# Patient Record
Sex: MALE | Race: WHITE | NOT HISPANIC OR LATINO | Employment: PART TIME | URBAN - NONMETROPOLITAN AREA
[De-identification: names, ages, dates, MRNs, and addresses within clinical notes are randomized per-mention and may not be internally consistent; named-entity substitution may affect disease eponyms.]

---

## 2017-02-22 ENCOUNTER — LAB (OUTPATIENT)
Dept: LAB | Facility: HOSPITAL | Age: 77
End: 2017-02-22
Attending: INTERNAL MEDICINE

## 2017-02-22 DIAGNOSIS — E78.5 HYPERLIPIDEMIA: ICD-10-CM

## 2017-02-22 DIAGNOSIS — I25.118 CORONARY ARTERY DISEASE INVOLVING NATIVE CORONARY ARTERY OF NATIVE HEART WITH OTHER FORM OF ANGINA PECTORIS (HCC): ICD-10-CM

## 2017-02-22 DIAGNOSIS — R07.2 PRECORDIAL PAIN: ICD-10-CM

## 2017-02-22 LAB
ALT SERPL W P-5'-P-CCNC: 23 U/L (ref 10–44)
AST SERPL-CCNC: 23 U/L (ref 10–34)

## 2017-02-22 PROCEDURE — 36415 COLL VENOUS BLD VENIPUNCTURE: CPT

## 2017-02-22 PROCEDURE — 84450 TRANSFERASE (AST) (SGOT): CPT | Performed by: INTERNAL MEDICINE

## 2017-02-22 PROCEDURE — 84460 ALANINE AMINO (ALT) (SGPT): CPT | Performed by: INTERNAL MEDICINE

## 2017-02-23 ENCOUNTER — OFFICE VISIT (OUTPATIENT)
Dept: CARDIOLOGY | Facility: CLINIC | Age: 77
End: 2017-02-23

## 2017-02-23 VITALS
WEIGHT: 235.2 LBS | BODY MASS INDEX: 33.67 KG/M2 | SYSTOLIC BLOOD PRESSURE: 151 MMHG | OXYGEN SATURATION: 93 % | HEART RATE: 56 BPM | DIASTOLIC BLOOD PRESSURE: 90 MMHG | HEIGHT: 70 IN

## 2017-02-23 DIAGNOSIS — I25.110 CORONARY ARTERY DISEASE INVOLVING NATIVE CORONARY ARTERY OF NATIVE HEART WITH UNSTABLE ANGINA PECTORIS (HCC): Primary | ICD-10-CM

## 2017-02-23 DIAGNOSIS — E78.2 MIXED HYPERLIPIDEMIA: ICD-10-CM

## 2017-02-23 DIAGNOSIS — E66.09 NON MORBID OBESITY DUE TO EXCESS CALORIES: ICD-10-CM

## 2017-02-23 DIAGNOSIS — I10 ESSENTIAL HYPERTENSION: ICD-10-CM

## 2017-02-23 PROCEDURE — 99214 OFFICE O/P EST MOD 30 MIN: CPT | Performed by: INTERNAL MEDICINE

## 2017-02-23 RX ORDER — LISINOPRIL AND HYDROCHLOROTHIAZIDE 20; 12.5 MG/1; MG/1
2 TABLET ORAL DAILY
Qty: 60 TABLET | Refills: 5 | COMMUNITY
Start: 2017-02-23 | End: 2017-02-23 | Stop reason: SDUPTHER

## 2017-02-23 RX ORDER — LISINOPRIL AND HYDROCHLOROTHIAZIDE 20; 12.5 MG/1; MG/1
2 TABLET ORAL DAILY
Qty: 60 TABLET | Refills: 5 | Status: SHIPPED | OUTPATIENT
Start: 2017-02-23 | End: 2017-06-21 | Stop reason: SDUPTHER

## 2017-02-28 ENCOUNTER — TELEPHONE (OUTPATIENT)
Dept: CARDIOLOGY | Facility: CLINIC | Age: 77
End: 2017-02-28

## 2017-02-28 NOTE — TELEPHONE ENCOUNTER
----- Message from Cayetano Jensen MD sent at 2/23/2017  8:10 PM EST -----  Let him know his liver tests are normal    sdf

## 2017-03-01 ENCOUNTER — TELEPHONE (OUTPATIENT)
Dept: CARDIOLOGY | Facility: CLINIC | Age: 77
End: 2017-03-01

## 2017-03-01 NOTE — TELEPHONE ENCOUNTER
----- Message from Cayetano Jensen MD sent at 2/23/2017  8:10 PM EST -----  Let him know his liver tests are normal    sdf    CALLED PT TO MAKE HIM AWARE THAT PER DR. JENSEN HIS LIVER TESTS ARE NORMAL.    PT AWARE & UNDERSTANDS.    CSMA

## 2017-06-03 ENCOUNTER — TRANSCRIBE ORDERS (OUTPATIENT)
Dept: PULMONOLOGY | Facility: HOSPITAL | Age: 77
End: 2017-06-03

## 2017-06-03 ENCOUNTER — LAB (OUTPATIENT)
Dept: LAB | Facility: HOSPITAL | Age: 77
End: 2017-06-03
Attending: INTERNAL MEDICINE

## 2017-06-03 DIAGNOSIS — E78.5 HYPERLIPIDEMIA, UNSPECIFIED HYPERLIPIDEMIA TYPE: ICD-10-CM

## 2017-06-03 DIAGNOSIS — E55.9 VITAMIN D DEFICIENCY: ICD-10-CM

## 2017-06-03 DIAGNOSIS — I10 ESSENTIAL HYPERTENSION: ICD-10-CM

## 2017-06-03 DIAGNOSIS — I10 ESSENTIAL HYPERTENSION: Primary | ICD-10-CM

## 2017-06-03 LAB
25(OH)D3 SERPL-MCNC: 67 NG/ML
ALBUMIN SERPL-MCNC: 3.9 G/DL (ref 3.4–4.8)
ALBUMIN/GLOB SERPL: 1.2 G/DL (ref 1.5–2.5)
ALP SERPL-CCNC: 115 U/L (ref 40–129)
ALT SERPL W P-5'-P-CCNC: 24 U/L (ref 10–44)
ANION GAP SERPL CALCULATED.3IONS-SCNC: 3.9 MMOL/L (ref 3.6–11.2)
AST SERPL-CCNC: 29 U/L (ref 10–34)
BASOPHILS # BLD AUTO: 0.01 10*3/MM3 (ref 0–0.3)
BASOPHILS NFR BLD AUTO: 0.2 % (ref 0–2)
BILIRUB SERPL-MCNC: 0.9 MG/DL (ref 0.2–1.8)
BUN BLD-MCNC: 14 MG/DL (ref 7–21)
BUN/CREAT SERPL: 14 (ref 7–25)
CALCIUM SPEC-SCNC: 8.7 MG/DL (ref 7.7–10)
CHLORIDE SERPL-SCNC: 105 MMOL/L (ref 99–112)
CHOLEST SERPL-MCNC: 109 MG/DL (ref 0–200)
CO2 SERPL-SCNC: 28.1 MMOL/L (ref 24.3–31.9)
CREAT BLD-MCNC: 1 MG/DL (ref 0.43–1.29)
DEPRECATED RDW RBC AUTO: 41.8 FL (ref 37–54)
EOSINOPHIL # BLD AUTO: 0.17 10*3/MM3 (ref 0–0.7)
EOSINOPHIL NFR BLD AUTO: 3.1 % (ref 0–7)
ERYTHROCYTE [DISTWIDTH] IN BLOOD BY AUTOMATED COUNT: 13.7 % (ref 11.5–14.5)
FOLATE SERPL-MCNC: 20.31 NG/ML (ref 5.4–20)
GFR SERPL CREATININE-BSD FRML MDRD: 73 ML/MIN/1.73
GLOBULIN UR ELPH-MCNC: 3.2 GM/DL
GLUCOSE BLD-MCNC: 123 MG/DL (ref 70–110)
HBA1C MFR BLD: 6.3 % (ref 4.5–5.7)
HCT VFR BLD AUTO: 42.5 % (ref 42–52)
HDLC SERPL-MCNC: 30 MG/DL (ref 60–100)
HGB BLD-MCNC: 14.7 G/DL (ref 14–18)
IMM GRANULOCYTES # BLD: 0.01 10*3/MM3 (ref 0–0.03)
IMM GRANULOCYTES NFR BLD: 0.2 % (ref 0–0.5)
LDLC SERPL CALC-MCNC: 61 MG/DL (ref 0–100)
LDLC/HDLC SERPL: 2.03 {RATIO}
LYMPHOCYTES # BLD AUTO: 1.97 10*3/MM3 (ref 1–3)
LYMPHOCYTES NFR BLD AUTO: 35.4 % (ref 16–46)
MCH RBC QN AUTO: 29.2 PG (ref 27–33)
MCHC RBC AUTO-ENTMCNC: 34.6 G/DL (ref 33–37)
MCV RBC AUTO: 84.3 FL (ref 80–94)
MONOCYTES # BLD AUTO: 0.61 10*3/MM3 (ref 0.1–0.9)
MONOCYTES NFR BLD AUTO: 11 % (ref 0–12)
NEUTROPHILS # BLD AUTO: 2.79 10*3/MM3 (ref 1.4–6.5)
NEUTROPHILS NFR BLD AUTO: 50.1 % (ref 40–75)
OSMOLALITY SERPL CALC.SUM OF ELEC: 275.7 MOSM/KG (ref 273–305)
PLATELET # BLD AUTO: 179 10*3/MM3 (ref 130–400)
PMV BLD AUTO: 11.2 FL (ref 6–10)
POTASSIUM BLD-SCNC: 3.5 MMOL/L (ref 3.5–5.3)
PROT SERPL-MCNC: 7.1 G/DL (ref 6–8)
PSA SERPL-MCNC: 0.49 NG/ML (ref 0–4)
RBC # BLD AUTO: 5.04 10*6/MM3 (ref 4.7–6.1)
SODIUM BLD-SCNC: 137 MMOL/L (ref 135–153)
TRIGL SERPL-MCNC: 90 MG/DL (ref 0–150)
VIT B12 BLD-MCNC: 368 PG/ML (ref 211–911)
VLDLC SERPL-MCNC: 18 MG/DL
WBC NRBC COR # BLD: 5.56 10*3/MM3 (ref 4.5–12.5)

## 2017-06-03 PROCEDURE — 84153 ASSAY OF PSA TOTAL: CPT | Performed by: INTERNAL MEDICINE

## 2017-06-03 PROCEDURE — 80053 COMPREHEN METABOLIC PANEL: CPT | Performed by: INTERNAL MEDICINE

## 2017-06-03 PROCEDURE — 82607 VITAMIN B-12: CPT | Performed by: INTERNAL MEDICINE

## 2017-06-03 PROCEDURE — 36415 COLL VENOUS BLD VENIPUNCTURE: CPT

## 2017-06-03 PROCEDURE — 80061 LIPID PANEL: CPT | Performed by: INTERNAL MEDICINE

## 2017-06-03 PROCEDURE — 82746 ASSAY OF FOLIC ACID SERUM: CPT | Performed by: INTERNAL MEDICINE

## 2017-06-03 PROCEDURE — 85025 COMPLETE CBC W/AUTO DIFF WBC: CPT | Performed by: INTERNAL MEDICINE

## 2017-06-03 PROCEDURE — 83036 HEMOGLOBIN GLYCOSYLATED A1C: CPT | Performed by: INTERNAL MEDICINE

## 2017-06-03 PROCEDURE — 82306 VITAMIN D 25 HYDROXY: CPT | Performed by: INTERNAL MEDICINE

## 2017-06-26 RX ORDER — LISINOPRIL AND HYDROCHLOROTHIAZIDE 20; 12.5 MG/1; MG/1
TABLET ORAL
Qty: 180 TABLET | Refills: 5 | Status: SHIPPED | OUTPATIENT
Start: 2017-06-26 | End: 2020-09-17

## 2017-12-04 ENCOUNTER — LAB (OUTPATIENT)
Dept: LAB | Facility: HOSPITAL | Age: 77
End: 2017-12-04
Attending: INTERNAL MEDICINE

## 2017-12-04 ENCOUNTER — TRANSCRIBE ORDERS (OUTPATIENT)
Dept: ADMINISTRATIVE | Facility: HOSPITAL | Age: 77
End: 2017-12-04

## 2017-12-04 DIAGNOSIS — E11.9 DIABETES MELLITUS WITHOUT COMPLICATION (HCC): ICD-10-CM

## 2017-12-04 DIAGNOSIS — E55.9 VITAMIN D DEFICIENCY: ICD-10-CM

## 2017-12-04 DIAGNOSIS — M79.604 RIGHT LEG PAIN: ICD-10-CM

## 2017-12-04 DIAGNOSIS — I10 BENIGN HYPERTENSION: ICD-10-CM

## 2017-12-04 DIAGNOSIS — E11.9 DIABETES MELLITUS WITHOUT COMPLICATION (HCC): Primary | ICD-10-CM

## 2017-12-04 LAB
25(OH)D3 SERPL-MCNC: 46 NG/ML
ALBUMIN SERPL-MCNC: 3.9 G/DL (ref 3.4–4.8)
ALBUMIN/GLOB SERPL: 1.3 G/DL (ref 1.5–2.5)
ALP SERPL-CCNC: 127 U/L (ref 40–129)
ALT SERPL W P-5'-P-CCNC: 27 U/L (ref 10–44)
ANION GAP SERPL CALCULATED.3IONS-SCNC: 5.1 MMOL/L (ref 3.6–11.2)
AST SERPL-CCNC: 25 U/L (ref 10–34)
BASOPHILS # BLD AUTO: 0.03 10*3/MM3 (ref 0–0.3)
BASOPHILS NFR BLD AUTO: 0.4 % (ref 0–2)
BILIRUB SERPL-MCNC: 0.7 MG/DL (ref 0.2–1.8)
BUN BLD-MCNC: 14 MG/DL (ref 7–21)
BUN/CREAT SERPL: 13.3 (ref 7–25)
CALCIUM SPEC-SCNC: 8.5 MG/DL (ref 7.7–10)
CHLORIDE SERPL-SCNC: 104 MMOL/L (ref 99–112)
CK SERPL-CCNC: 104 U/L (ref 24–204)
CO2 SERPL-SCNC: 30.9 MMOL/L (ref 24.3–31.9)
CREAT BLD-MCNC: 1.05 MG/DL (ref 0.43–1.29)
DEPRECATED RDW RBC AUTO: 41.9 FL (ref 37–54)
EOSINOPHIL # BLD AUTO: 0.29 10*3/MM3 (ref 0–0.7)
EOSINOPHIL NFR BLD AUTO: 4.3 % (ref 0–7)
ERYTHROCYTE [DISTWIDTH] IN BLOOD BY AUTOMATED COUNT: 13.8 % (ref 11.5–14.5)
FOLATE SERPL-MCNC: 15.76 NG/ML (ref 5.4–20)
GFR SERPL CREATININE-BSD FRML MDRD: 69 ML/MIN/1.73
GLOBULIN UR ELPH-MCNC: 3 GM/DL
GLUCOSE BLD-MCNC: 109 MG/DL (ref 70–110)
HBA1C MFR BLD: 6.3 % (ref 4.5–5.7)
HCT VFR BLD AUTO: 43.2 % (ref 42–52)
HGB BLD-MCNC: 14.6 G/DL (ref 14–18)
IMM GRANULOCYTES # BLD: 0.03 10*3/MM3 (ref 0–0.03)
IMM GRANULOCYTES NFR BLD: 0.4 % (ref 0–0.5)
LYMPHOCYTES # BLD AUTO: 1.91 10*3/MM3 (ref 1–3)
LYMPHOCYTES NFR BLD AUTO: 28.6 % (ref 16–46)
MCH RBC QN AUTO: 28.6 PG (ref 27–33)
MCHC RBC AUTO-ENTMCNC: 33.8 G/DL (ref 33–37)
MCV RBC AUTO: 84.7 FL (ref 80–94)
MONOCYTES # BLD AUTO: 0.68 10*3/MM3 (ref 0.1–0.9)
MONOCYTES NFR BLD AUTO: 10.2 % (ref 0–12)
NEUTROPHILS # BLD AUTO: 3.74 10*3/MM3 (ref 1.4–6.5)
NEUTROPHILS NFR BLD AUTO: 56.1 % (ref 40–75)
OSMOLALITY SERPL CALC.SUM OF ELEC: 280.5 MOSM/KG (ref 273–305)
PLATELET # BLD AUTO: 195 10*3/MM3 (ref 130–400)
PMV BLD AUTO: 10.9 FL (ref 6–10)
POTASSIUM BLD-SCNC: 3.6 MMOL/L (ref 3.5–5.3)
PROT SERPL-MCNC: 6.9 G/DL (ref 6–8)
RBC # BLD AUTO: 5.1 10*6/MM3 (ref 4.7–6.1)
SODIUM BLD-SCNC: 140 MMOL/L (ref 135–153)
VIT B12 BLD-MCNC: 512 PG/ML (ref 211–911)
WBC NRBC COR # BLD: 6.68 10*3/MM3 (ref 4.5–12.5)

## 2017-12-04 PROCEDURE — 82306 VITAMIN D 25 HYDROXY: CPT

## 2017-12-04 PROCEDURE — 80053 COMPREHEN METABOLIC PANEL: CPT

## 2017-12-04 PROCEDURE — 82607 VITAMIN B-12: CPT

## 2017-12-04 PROCEDURE — 83036 HEMOGLOBIN GLYCOSYLATED A1C: CPT

## 2017-12-04 PROCEDURE — 36415 COLL VENOUS BLD VENIPUNCTURE: CPT

## 2017-12-04 PROCEDURE — 82746 ASSAY OF FOLIC ACID SERUM: CPT

## 2017-12-04 PROCEDURE — 82550 ASSAY OF CK (CPK): CPT

## 2017-12-04 PROCEDURE — 85025 COMPLETE CBC W/AUTO DIFF WBC: CPT

## 2018-01-29 ENCOUNTER — TRANSCRIBE ORDERS (OUTPATIENT)
Dept: ADMINISTRATIVE | Facility: HOSPITAL | Age: 78
End: 2018-01-29

## 2018-01-29 ENCOUNTER — LAB (OUTPATIENT)
Dept: LAB | Facility: HOSPITAL | Age: 78
End: 2018-01-29
Attending: INTERNAL MEDICINE

## 2018-01-29 DIAGNOSIS — R19.7 DIARRHEA, UNSPECIFIED TYPE: Primary | ICD-10-CM

## 2018-01-29 DIAGNOSIS — R19.7 DIARRHEA, UNSPECIFIED TYPE: ICD-10-CM

## 2018-01-29 LAB
027 TOXIN: NORMAL
C DIFF TOX GENS STL QL NAA+PROBE: NEGATIVE
LACTOFERRIN STL QL LA: NEGATIVE

## 2018-01-29 PROCEDURE — 87045 FECES CULTURE AEROBIC BACT: CPT

## 2018-01-29 PROCEDURE — 83631 LACTOFERRIN FECAL (QUANT): CPT

## 2018-01-29 PROCEDURE — 87177 OVA AND PARASITES SMEARS: CPT

## 2018-01-29 PROCEDURE — 87209 SMEAR COMPLEX STAIN: CPT

## 2018-01-29 PROCEDURE — 87493 C DIFF AMPLIFIED PROBE: CPT

## 2018-01-29 PROCEDURE — 87046 STOOL CULTR AEROBIC BACT EA: CPT

## 2018-01-29 PROCEDURE — 87186 SC STD MICRODIL/AGAR DIL: CPT

## 2018-01-29 PROCEDURE — 87899 AGENT NOS ASSAY W/OPTIC: CPT

## 2018-02-01 LAB
BACTERIA SPEC AEROBE CULT: NORMAL
O+P SPEC MICRO: NORMAL
OVA + PARASITE RESULT 1: NORMAL

## 2018-03-05 ENCOUNTER — LAB (OUTPATIENT)
Dept: LAB | Facility: HOSPITAL | Age: 78
End: 2018-03-05
Attending: INTERNAL MEDICINE

## 2018-03-05 ENCOUNTER — TRANSCRIBE ORDERS (OUTPATIENT)
Dept: ADMINISTRATIVE | Facility: HOSPITAL | Age: 78
End: 2018-03-05

## 2018-03-05 DIAGNOSIS — E78.5 HYPERLIPIDEMIA, UNSPECIFIED HYPERLIPIDEMIA TYPE: ICD-10-CM

## 2018-03-05 DIAGNOSIS — R53.83 FATIGUE, UNSPECIFIED TYPE: ICD-10-CM

## 2018-03-05 DIAGNOSIS — I10 BENIGN HYPERTENSION: Primary | ICD-10-CM

## 2018-03-05 DIAGNOSIS — Z12.5 SPECIAL SCREENING FOR MALIGNANT NEOPLASM OF PROSTATE: ICD-10-CM

## 2018-03-05 DIAGNOSIS — E55.9 VITAMIN D DEFICIENCY: ICD-10-CM

## 2018-03-05 DIAGNOSIS — I10 BENIGN HYPERTENSION: ICD-10-CM

## 2018-03-05 LAB
25(OH)D3 SERPL-MCNC: 68 NG/ML
ALBUMIN SERPL-MCNC: 4 G/DL (ref 3.4–4.8)
ALBUMIN/GLOB SERPL: 1.4 G/DL (ref 1.5–2.5)
ALP SERPL-CCNC: 121 U/L (ref 40–129)
ALT SERPL W P-5'-P-CCNC: 20 U/L (ref 10–44)
ANION GAP SERPL CALCULATED.3IONS-SCNC: 5.7 MMOL/L (ref 3.6–11.2)
AST SERPL-CCNC: 21 U/L (ref 10–34)
BASOPHILS # BLD AUTO: 0.03 10*3/MM3 (ref 0–0.3)
BASOPHILS NFR BLD AUTO: 0.4 % (ref 0–2)
BILIRUB SERPL-MCNC: 0.6 MG/DL (ref 0.2–1.8)
BUN BLD-MCNC: 15 MG/DL (ref 7–21)
BUN/CREAT SERPL: 15.3 (ref 7–25)
CALCIUM SPEC-SCNC: 8.8 MG/DL (ref 7.7–10)
CHLORIDE SERPL-SCNC: 104 MMOL/L (ref 99–112)
CHOLEST SERPL-MCNC: 102 MG/DL (ref 0–200)
CO2 SERPL-SCNC: 28.3 MMOL/L (ref 24.3–31.9)
CREAT BLD-MCNC: 0.98 MG/DL (ref 0.43–1.29)
DEPRECATED RDW RBC AUTO: 42.8 FL (ref 37–54)
EOSINOPHIL # BLD AUTO: 0.22 10*3/MM3 (ref 0–0.7)
EOSINOPHIL NFR BLD AUTO: 3.2 % (ref 0–7)
ERYTHROCYTE [DISTWIDTH] IN BLOOD BY AUTOMATED COUNT: 13.8 % (ref 11.5–14.5)
FOLATE SERPL-MCNC: 14.6 NG/ML (ref 5.4–20)
GFR SERPL CREATININE-BSD FRML MDRD: 75 ML/MIN/1.73
GLOBULIN UR ELPH-MCNC: 2.8 GM/DL
GLUCOSE BLD-MCNC: 115 MG/DL (ref 70–110)
HCT VFR BLD AUTO: 42.8 % (ref 42–52)
HDLC SERPL-MCNC: 29 MG/DL (ref 60–100)
HGB BLD-MCNC: 14.4 G/DL (ref 14–18)
IMM GRANULOCYTES # BLD: 0.02 10*3/MM3 (ref 0–0.03)
IMM GRANULOCYTES NFR BLD: 0.3 % (ref 0–0.5)
LDLC SERPL CALC-MCNC: 56 MG/DL (ref 0–100)
LDLC/HDLC SERPL: 1.94 {RATIO}
LYMPHOCYTES # BLD AUTO: 2.04 10*3/MM3 (ref 1–3)
LYMPHOCYTES NFR BLD AUTO: 29.7 % (ref 16–46)
MCH RBC QN AUTO: 28.7 PG (ref 27–33)
MCHC RBC AUTO-ENTMCNC: 33.6 G/DL (ref 33–37)
MCV RBC AUTO: 85.4 FL (ref 80–94)
MONOCYTES # BLD AUTO: 0.79 10*3/MM3 (ref 0.1–0.9)
MONOCYTES NFR BLD AUTO: 11.5 % (ref 0–12)
NEUTROPHILS # BLD AUTO: 3.78 10*3/MM3 (ref 1.4–6.5)
NEUTROPHILS NFR BLD AUTO: 54.9 % (ref 40–75)
OSMOLALITY SERPL CALC.SUM OF ELEC: 277.4 MOSM/KG (ref 273–305)
PLATELET # BLD AUTO: 188 10*3/MM3 (ref 130–400)
PMV BLD AUTO: 10.8 FL (ref 6–10)
POTASSIUM BLD-SCNC: 3.6 MMOL/L (ref 3.5–5.3)
PROT SERPL-MCNC: 6.8 G/DL (ref 6–8)
PSA SERPL-MCNC: 0.38 NG/ML (ref 0–4)
RBC # BLD AUTO: 5.01 10*6/MM3 (ref 4.7–6.1)
SODIUM BLD-SCNC: 138 MMOL/L (ref 135–153)
TRIGL SERPL-MCNC: 83 MG/DL (ref 0–150)
VIT B12 BLD-MCNC: 289 PG/ML (ref 211–911)
VLDLC SERPL-MCNC: 16.6 MG/DL
WBC NRBC COR # BLD: 6.88 10*3/MM3 (ref 4.5–12.5)

## 2018-03-05 PROCEDURE — 82306 VITAMIN D 25 HYDROXY: CPT

## 2018-03-05 PROCEDURE — 80061 LIPID PANEL: CPT

## 2018-03-05 PROCEDURE — 82607 VITAMIN B-12: CPT

## 2018-03-05 PROCEDURE — 82746 ASSAY OF FOLIC ACID SERUM: CPT

## 2018-03-05 PROCEDURE — G0103 PSA SCREENING: HCPCS

## 2018-03-05 PROCEDURE — 85025 COMPLETE CBC W/AUTO DIFF WBC: CPT

## 2018-03-05 PROCEDURE — 36415 COLL VENOUS BLD VENIPUNCTURE: CPT

## 2018-03-05 PROCEDURE — 80053 COMPREHEN METABOLIC PANEL: CPT

## 2018-03-12 ENCOUNTER — TRANSCRIBE ORDERS (OUTPATIENT)
Dept: ADMINISTRATIVE | Facility: HOSPITAL | Age: 78
End: 2018-03-12

## 2018-03-12 DIAGNOSIS — I73.9 PAD (PERIPHERAL ARTERY DISEASE) (HCC): Primary | ICD-10-CM

## 2018-03-16 ENCOUNTER — OFFICE VISIT (OUTPATIENT)
Dept: CARDIOLOGY | Facility: CLINIC | Age: 78
End: 2018-03-16

## 2018-03-16 VITALS
OXYGEN SATURATION: 95 % | HEART RATE: 55 BPM | SYSTOLIC BLOOD PRESSURE: 127 MMHG | HEIGHT: 69 IN | WEIGHT: 234.8 LBS | BODY MASS INDEX: 34.78 KG/M2 | DIASTOLIC BLOOD PRESSURE: 79 MMHG

## 2018-03-16 DIAGNOSIS — I10 ESSENTIAL HYPERTENSION: Primary | ICD-10-CM

## 2018-03-16 DIAGNOSIS — E78.2 MIXED HYPERLIPIDEMIA: ICD-10-CM

## 2018-03-16 DIAGNOSIS — I25.110 CORONARY ARTERY DISEASE INVOLVING NATIVE CORONARY ARTERY OF NATIVE HEART WITH UNSTABLE ANGINA PECTORIS (HCC): ICD-10-CM

## 2018-03-16 PROCEDURE — 99213 OFFICE O/P EST LOW 20 MIN: CPT | Performed by: INTERNAL MEDICINE

## 2018-03-16 PROCEDURE — 93000 ELECTROCARDIOGRAM COMPLETE: CPT | Performed by: INTERNAL MEDICINE

## 2018-03-16 NOTE — PROGRESS NOTES
[facility] CARDIOLOGY  [address] [facility] [address]  [place] [address]  Phone: [phone]  Fax: [phone]    03/16/2018    Chief Complaint: Routine followup of , CAD    History:   Christopher Clay is a 77 y.o. male seen in followup, Pt with hx as below. Non smoker. No chest pain or SOB. Having some claudication symptoms for 6 months R> L. With walking several blocks. No PAD hx. Mild edema c/o. Taking ASA plavix and lipitor. CK normal in 12/2017.             Christopher is a very pleasant 76-year-old gentleman who presented in June 2015 with an acutely occluded LAD. At the time he had an ejection fraction of 45% and minimal hypokinesis of the anterior wall. He had no other significant disease. Immediately postprocedure he initially complained of some chest tightness however this subsequently improved.  However, his symptoms worsened and he eventually underwent a stress Cardiolite test which demonstrated significant ischemia of the anterior territory. In October 2016 he underwent repeat cardiac catheterization which demonstrated severe disease at the proximal stent margin. He underwent a second drug-eluting stent implantation at that time and states that since then he has felt much better. He denies any angina or anginal-like symptoms. He denies any heart failure symptoms. He denies any palpitations, near syncope or syncopal symptoms. He has been compliant on medical therapy.  He does note that his blood pressures have been elevated with systolics in the 150 range and diastolics in the 80s to 90s    Past Medical History:   Diagnosis Date   • Hyperlipidemia    • Hypertension        Past Social History:  Social History     Social History   • Marital status:      Social History Main Topics   • Smoking status: Never Smoker   • Smokeless tobacco: Never Used   • Alcohol use No   • Drug use: No   • Sexual activity: Defer     Other Topics Concern   • Not on file       Past Family History:  Family  History   Problem Relation Age of Onset   • Heart failure Mother    • No Known Problems Father    • Heart failure Brother        Review of Systems:   Please see HPI  Constitution: No chills, no rigors, no unexplained weight loss or weight gain  Eyes:  No diplopia, no blurred vision, no loss of vision, conjunctiva is pink and sclera is anicteric  ENT:  No tinnitus, no otorrhea, no epistaxis, no sore throat   Respiratory: No cough, no hemoptysis  Cardiovascular: see HPI  Gastrointestinal: No nausea, no vomiting, no hematemesis, no diarrhea or constipation, no melena  Genitourinary: No frequency of dysuria no hematuria  Integument: No pruritis and  no skin rash  Hematologic / Lymphatic: No excessive bleeding, easy bruising, fatigue, lymphadenopathy and petechiae  Musculoskeletal: No joint pain, joint stiffness, joint swelling, muscle pain, muscle weakness and neck pain  Neurological: No dizziness, headaches, light headedness, seizures and vertigo  Endocrine: No frequent urination and nocturia, temperature intolerance, weight gain, unintended and weight loss, unintended      Current Outpatient Prescriptions on File Prior to Visit   Medication Sig Dispense Refill   • aspirin 81 MG EC tablet Take 81 mg by mouth daily.     • atorvastatin (LIPITOR) 40 MG tablet Take 40 mg by mouth daily. 1 TABLET AT BEDTIME     • carvedilol (COREG) 12.5 MG tablet Take 12.5 mg by mouth Every 12 (Twelve) Hours.     • Cholecalciferol (VITAMIN D) 2000 UNITS tablet Take 2,000 Units by mouth Daily.     • clopidogrel (PLAVIX) 75 MG tablet Take 75 mg by mouth daily.     • lisinopril-hydrochlorothiazide (PRINZIDE,ZESTORETIC) 20-12.5 MG per tablet TAKE 2 TABLETS BY MOUTH DAILY 180 tablet 5   • saccharomyces boulardii (FLORASTOR) 250 MG capsule Take 250 mg by mouth 2 (Two) Times a Day.       No current facility-administered medications on file prior to visit.        No Known Allergies    Objective:  Vitals:    03/16/18 1146   BP: 127/79   Pulse: 55    SpO2: 95%     Comfortable NAD  PERRL, conjunctiva clear  Neck supple, no JVD or thyromegaly appreciated  S1/S2 RRR, no m/r/g  Lungs CTA B, normal effort  Abdomen S/NT/ND (+) BS, no HSM appreciated  Extremities warm, no clubbing, cyanosis, or edema  Normal gait  No visible or palpable skin lesions  A/Ox4, mood and affect appropriate  Pulse exam: Geoffrey's:    Some changes of mild chronic venous stasis and no ulcer but there are some mild edema and mild varicosities right calf. 2+ DP and PT BLE.    DATA:            Results for orders placed during the hospital encounter of 10/18/16   Stress test with myocardial perfusion one day    Narrative · Left ventricular ejection fraction is normal (Calculated EF = 51%).  · Myocardial perfusion imaging indicates a large-sized, moderate-to-severe   area of ischemia located in the anterior wall.  · Impressions are consistent with a high risk study.  · moderate risk for ischemic heart disease.          Results for orders placed during the hospital encounter of 10/18/16   Stress test with myocardial perfusion one day    Narrative · Left ventricular ejection fraction is normal (Calculated EF = 51%).  · Myocardial perfusion imaging indicates a large-sized, moderate-to-severe   area of ischemia located in the anterior wall.  · Impressions are consistent with a high risk study.  · moderate risk for ischemic heart disease.          Results for orders placed during the hospital encounter of 11/01/16   Cardiac catheterization    Addendum  · Right dominant coronary artery system with single vessel disease  involving the LAD · Successful angioplasty and stenting of the proximal LAD.   Final impression: Right dominant coronary artery system with single vessel disease involving  the LAD Successful angioplasty and stenting of the LAD  Procedures performed: Coronary angiography Angioplasty and stenting of the LAD  History of present illness: Christopher is a very pleasant 76-year-old gentleman who  presents with a  history of known coronary artery disease status post stent implantation to  his proximal LAD.  He had been doing well however had recurrent chest  discomfort which was initially managed medically.  He eventually did  undergo a stress Cardiolite test which was abnormal and suggestive of  considerable ischemia involving the anterior wall.  The test was  interpreted as moderate risk.  Given this as well as his progressive  symptoms despite medical management he was referred for cardiac  catheterization with possible stent implantation.  The patient was given  informed consent apprising the risks and benefits of doing so he  understood these risks and agreed to proceed.  Procedure in detail the patient was brought to the cardiac catheterization  laboratory and prepped and draped in the usual sterile fashion.  Adequate  anesthesia was obtained by infiltrating the right groin with local  lidocaine.  Using a modified Seldinger technique a 5 [place] sheath was  placed in the right femoral artery.  A 5 [place] JL4 catheter was used to  engage the ostium of the left main coronary artery and angiography was  performed in varying views using hand injection of contrast material.   After that a 5 [place] JR4 catheter was used to engage the ostium of the  right coronary artery and angiography was again performed in varying views  using hand injection of contrast material.  At this point the patient was  given adequate heparinization to achieve an ACT greater than 200.  He  already been taking Plavix.  A 6 [place] sheath was exchanged for the 5  [place] sheath and a 6 [place] XB 4.0 guiding catheter was used to engage  the ostium of the left main coronary artery.  A BMW was placed  atraumatically in the distal portion of the LAD.  The lesion was  predilated with a 2.0 x 15 mm balloon.  A 3.0 x 12 mm Alpine drug-eluting  stent was then implanted at 12 em.  The balloon was reinflated to 12 em.   After this the stent was  postdilated with a 3.5 mm x 12 mm noncompliant  balloon.  Inflation of 12 em was used in the distal portion of the stent  and inflation of 8 em was used approximately to adequately match both  ends of the stent to the vessel diameter.  After this orthogonal  angiography revealed an Result without evidence of dissection and BAY-3 flow in the distal  vascular bed.  The guidewire was withdrawn and orthogonal in Mirian's  repeated which confirmed the above-noted results.  The patient was  returned to the floor without evidence of complication.  Findings in detail: Left Main coronary artery: The left main coronary artery is a large vessel which trifurcates into the  LAD and ramus intermedius branch and the circumflex artery.  The left main  coronary artery is free of atherosclerotic disease.  Left anterior descending artery: The left anterior descending artery is a large vessel which reaches beyond  the apex the ventricle and gives rise to 2 diagonal branches the first of  which is small in the next of which is moderate in size.  There is a  previously implanted stent in the proximal portion of the LAD.  Just  proximal to this there is a severe 99% stenotic area.  This is the culprit  lesion.  The lesion length is 10 mm.  The vessel diameter is approximately  3.25 mm.  BAY flow prior to intervention is BAY 2 BAY flow  postintervention is BAY-3. Post intervention the lesion was reduced to a  0% residual stenosis.  Circumflex artery: The circumflex artery is a moderate-sized vessel which gives rise to one  branching obtuse marginal vessel.  The circumflex artery is free of  atherosclerotic disease.  Ramus intermedius branch: The ramus intermedius branch is a small to moderate-sized vessel which is  free of after scrubbing disease.  Right coronary artery: The right coronary artery is a large vessel which gives rise to the  posterior descending artery and several posterior lateral branches.  The  right coronary  artery is free of after scrubbing disease.  Final impression and plan: Overall it is my impression that Mr. Clay has undergone successful  percutaneous revascularization of the LAD.  I did review his prior films.   In retrospect I did not see anything that was overtly concerning and would  predict in-stent restenosis.  I suspect that he will do well however,  given his clinical history I will follow him closely over the next several  months.       Cayetano Jensen MD 11/21/2016  1:32 PM          Narrative · Right dominant coronary artery system with single vessel disease   involving the LAD  · Successful angioplasty and stenting of the proximal LAD.     Final impression:  Right dominant coronary artery system with single vessel disease involving   the LAD  Successful angioplasty and stenting of the LAD    Procedures performed:  Coronary angiography  Angioplasty and stenting of the LAD    History of present illness:  Christopher is a very pleasant 76-year-old gentleman who presents with a   history of known coronary artery disease status post stent implantation to   his proximal LAD.  He had been doing well however had recurrent chest   discomfort which was initially managed medically.  He eventually did   undergo a stress Cardiolite test which was abnormal and suggestive of   considerable ischemia involving the anterior wall.  The test was   interpreted as moderate risk.  Given this as well as his progressive   symptoms despite medical management he was referred for cardiac   catheterization with possible stent implantation.  The patient was given   informed consent apprising the risks and benefits of doing so he   understood these risks and agreed to proceed.    Procedure in detail the patient was brought to the cardiac catheterization   laboratory and prepped and draped in the usual sterile fashion.  Adequate   anesthesia was obtained by infiltrating the right groin with local   lidocaine.  Using a modified  Seldinger technique a 5 [place] sheath was   placed in the right femoral artery.  A 5 [place] JL4 catheter was used to   engage the ostium of the left main coronary artery and angiography was   performed in varying views using hand injection of contrast material.    After that a 5 [place] JR4 catheter was used to engage the ostium of the   right coronary artery and angiography was again performed in varying views   using hand injection of contrast material.  At this point the patient was   given adequate heparinization to achieve an ACT greater than 200.  He   already been taking Plavix.  A 6 [place]ian sheath was exchanged for the 5   [place] sheath and a 6 [place] XB 4.0 guiding catheter was used to engage   the ostium of the left main coronary artery.  A BMW was placed   atraumatically in the distal portion of the LAD.  The lesion was   predilated with a 2.0 x 15 mm balloon.  A 3.0 x 12 mm Alpine drug-eluting   stent was then implanted at 12 em.  The balloon was reinflated to 12 em.    After this the stent was postdilated with a 3.5 mm x 12 mm noncompliant   balloon.  Inflation of 12 em was used in the distal portion of the stent   and inflation of 8 em was used approximately to adequately match both   ends of the stent to the vessel diameter.  After this orthogonal   angiography revealed an  Result without evidence of dissection and BAY-3 flow in the distal   vascular bed.  The guidewire was withdrawn and orthogonal in Mirian's   repeated which confirmed the above-noted results.  The patient was   returned to the floor without evidence of complication.    Findings in detail:  Left Main coronary artery:  The left main coronary artery is a large vessel which trifurcates into the   LAD and ramus intermedius branch and the circumflex artery.  The left main   coronary artery is free of atherosclerotic disease.    Left anterior descending artery:  The left anterior descending artery is a large vessel which reaches  beyond   the apex the ventricle and gives rise to 2 diagonal branches the first of   which is small in the next of which is moderate in size.  There is a   previously implanted stent in the proximal portion of the LAD.  Just   proximal to this there is a severe 99% stenotic area.  This is the culprit   lesion.  The lesion length is 10 mm.  The vessel diameter is approximately   3.25 mm.  BAY flow prior to intervention is BAY 2 BAY flow   postintervention is BAY-3.    Circumflex artery:  The circumflex artery is a moderate-sized vessel which gives rise to one   branching obtuse marginal vessel.  The circumflex artery is free of   atherosclerotic disease.    Ramus intermedius branch:  The ramus intermedius branch is a small to moderate-sized vessel which is   free of after scrubbing disease.    Right coronary artery:  The right coronary artery is a large vessel which gives rise to the   posterior descending artery and several posterior lateral branches.  The   right coronary artery is free of after scrubbing disease.    Final impression and plan:  Overall it is my impression that Mr. Clay has undergone successful   percutaneous revascularization of the LAD.  I did review his prior films.    In retrospect I did not see anything that was overtly concerning and would   predict in-stent restenosis.  I suspect that he will do well however,   given his clinical history I will follow him closely over the next several   months.         ECG 12 Lead  Date/Time: 3/16/2018 11:33 AM  Performed by: KIRBY GARCIA  Authorized by: KIRBY GARCIA               I personally viewed and interpreted the patient's EKG:      A/P:    CAD: stable. LDL nml.     Claudication: venous. Recommend TEDS hose. nml pulses. Teresa Q10.    F/u 6months.           Thank you for allowing me to participate in the care of Christopher Clay. Feel free to contact me directly with any further questions or concerns.

## 2018-09-10 ENCOUNTER — TELEPHONE (OUTPATIENT)
Dept: CARDIOLOGY | Facility: CLINIC | Age: 78
End: 2018-09-10

## 2018-09-10 NOTE — TELEPHONE ENCOUNTER
Christopher stopped by our office on Friday concerned that his PCP had mentioned a block in his heart that showed up on his EKG. I have asked him to clarify if that means they suspect a blockage or just the summary at the top of the EKG showed a block. He is unsure, he was just concerned that he would have a blockage and he didn't necessarily want to wait until his follow up appointment to find out. He was afraid he needed to be seen sooner.    I requested these records from PCP.     Showed PCP records (EKGs and note) to Chari Ramirez who has compared it to his last EKG in our office from March 2018. She states there are no changes. The AV block that it is showing is something that has been there since his stent in 2015. She would like me to call the patient back to give him peace of mind that these are all things that we are aware of and there have been no changes.    Tried to call patient and left message for him to call me back.

## 2018-10-12 ENCOUNTER — OFFICE VISIT (OUTPATIENT)
Dept: CARDIOLOGY | Facility: CLINIC | Age: 78
End: 2018-10-12

## 2018-10-12 VITALS
BODY MASS INDEX: 34.36 KG/M2 | WEIGHT: 232 LBS | HEIGHT: 69 IN | OXYGEN SATURATION: 99 % | SYSTOLIC BLOOD PRESSURE: 118 MMHG | HEART RATE: 53 BPM | DIASTOLIC BLOOD PRESSURE: 73 MMHG

## 2018-10-12 DIAGNOSIS — I10 ESSENTIAL HYPERTENSION: ICD-10-CM

## 2018-10-12 DIAGNOSIS — E78.2 MIXED HYPERLIPIDEMIA: ICD-10-CM

## 2018-10-12 DIAGNOSIS — I25.110 CORONARY ARTERY DISEASE INVOLVING NATIVE CORONARY ARTERY OF NATIVE HEART WITH UNSTABLE ANGINA PECTORIS (HCC): Primary | ICD-10-CM

## 2018-10-12 PROCEDURE — 99213 OFFICE O/P EST LOW 20 MIN: CPT | Performed by: INTERNAL MEDICINE

## 2018-10-12 NOTE — PROGRESS NOTES
[facility] CARDIOLOGY  [address] [place] [address]  Phone: [phone]  Fax: [phone]    10/12/2018    Chief Complaint: Routine followup of , CAD    History:   Christopher Clay is a 78 y.o. male seen in followup, Pt with hx CAD, noted stable at last visit. Some mild R>L discomfort worse when laying down. Restless feeling in feet. Venous disease noted in leg exam with nml pulses last visit and recommended compression. Wore compression and helped but were hot. Taking ASA and statin. No new CP or SOB. Fairly active. Having less stamina.      Christopher Clay is a 77 y.o. male seen in followup, Pt with hx as below. Non smoker. No chest pain or SOB. Having some claudication symptoms for 6 months R> L. With walking several blocks. No PAD hx. Mild edema c/o. Taking ASA plavix and lipitor. CK normal in 12/2017.           Christopher is a very pleasant 76-year-old gentleman who presented in June 2015 with an acutely occluded LAD. At the time he had an ejection fraction of 45% and minimal hypokinesis of the anterior wall. He had no other significant disease. Immediately postprocedure he initially complained of some chest tightness however this subsequently improved.  However, his symptoms worsened and he eventually underwent a stress Cardiolite test which demonstrated significant ischemia of the anterior territory. In October 2016 he underwent repeat cardiac catheterization which demonstrated severe disease at the proximal stent margin. He underwent a second drug-eluting stent implantation at that time and states that since then he has felt much better. He denies any angina or anginal-like symptoms. He denies any heart failure symptoms. He denies any palpitations, near syncope or syncopal symptoms. He has been compliant on medical therapy.  He does note that his blood pressures have been elevated with systolics in the 150 range and diastolics in the 80s to 90s    Past Medical History:    Diagnosis Date   • Hyperlipidemia    • Hypertension        Past Social History:  Social History     Social History   • Marital status:      Social History Main Topics   • Smoking status: Never Smoker   • Smokeless tobacco: Never Used   • Alcohol use No   • Drug use: No   • Sexual activity: Defer     Other Topics Concern   • Not on file       Past Family History:  Family History   Problem Relation Age of Onset   • Heart failure Mother    • No Known Problems Father    • Heart failure Brother        Review of Systems:   Please see HPI  Constitution: No chills, no rigors, no unexplained weight loss or weight gain  Eyes:  No diplopia, no blurred vision, no loss of vision, conjunctiva is pink and sclera is anicteric  ENT:  No tinnitus, no otorrhea, no epistaxis, no sore throat   Respiratory: No cough, no hemoptysis  Cardiovascular: see HPI  Gastrointestinal: No nausea, no vomiting, no hematemesis, no diarrhea or constipation, no melena  Genitourinary: No frequency of dysuria no hematuria  Integument: No pruritis and  no skin rash  Hematologic / Lymphatic: No excessive bleeding, easy bruising, fatigue, lymphadenopathy and petechiae  Musculoskeletal: No joint pain, joint stiffness, joint swelling, muscle pain, muscle weakness and neck pain  Neurological: No dizziness, headaches, light headedness, seizures and vertigo  Endocrine: No frequent urination and nocturia, temperature intolerance, weight gain, unintended and weight loss, unintended      Current Outpatient Prescriptions on File Prior to Visit   Medication Sig Dispense Refill   • aspirin 81 MG EC tablet Take 81 mg by mouth daily.     • atorvastatin (LIPITOR) 40 MG tablet Take 40 mg by mouth daily. 1 TABLET AT BEDTIME     • carvedilol (COREG) 12.5 MG tablet Take 12.5 mg by mouth Every 12 (Twelve) Hours.     • Cholecalciferol (VITAMIN D) 2000 UNITS tablet Take 2,000 Units by mouth Daily.     • clopidogrel (PLAVIX) 75 MG tablet Take 75 mg by mouth daily.     •  lisinopril-hydrochlorothiazide (PRINZIDE,ZESTORETIC) 20-12.5 MG per tablet TAKE 2 TABLETS BY MOUTH DAILY (Patient taking differently: TAKE 1 TABLET BY MOUTH TWICE DAILY) 180 tablet 5   • saccharomyces boulardii (FLORASTOR) 250 MG capsule Take 250 mg by mouth 2 (Two) Times a Day.       No current facility-administered medications on file prior to visit.        No Known Allergies    Objective:  Vitals:    10/12/18 1124   BP: 118/73   Pulse: 53   SpO2: 99%     Comfortable NAD  PERRL, conjunctiva clear  Neck supple, no JVD or thyromegaly appreciated  S1/S2 RRR, no m/r/g  Lungs CTA B, normal effort  Abdomen S/NT/ND (+) BS, no HSM appreciated  Extremities warm, no clubbing, cyanosis, or edema  Normal gait  No visible or palpable skin lesions  A/Ox4, mood and affect appropriate  Pulse exam: Geoffrey's:    DATA:            Results for orders placed during the hospital encounter of 10/18/16   Stress test with myocardial perfusion one day    Narrative · Left ventricular ejection fraction is normal (Calculated EF = 51%).  · Myocardial perfusion imaging indicates a large-sized, moderate-to-severe   area of ischemia located in the anterior wall.  · Impressions are consistent with a high risk study.  · moderate risk for ischemic heart disease.          Results for orders placed during the hospital encounter of 10/18/16   Stress test with myocardial perfusion one day    Narrative · Left ventricular ejection fraction is normal (Calculated EF = 51%).  · Myocardial perfusion imaging indicates a large-sized, moderate-to-severe   area of ischemia located in the anterior wall.  · Impressions are consistent with a high risk study.  · moderate risk for ischemic heart disease.          Results for orders placed during the hospital encounter of 11/01/16   Cardiac catheterization    Addendum  · Right dominant coronary artery system with single vessel disease  involving the LAD · Successful angioplasty and stenting of the proximal LAD.   Final  impression: Right dominant coronary artery system with single vessel disease involving  the LAD Successful angioplasty and stenting of the LAD  Procedures performed: Coronary angiography Angioplasty and stenting of the LAD  History of present illness: Christopher is a very pleasant 76-year-old gentleman who presents with a  history of known coronary artery disease status post stent implantation to  his proximal LAD.  He had been doing well however had recurrent chest  discomfort which was initially managed medically.  He eventually did  undergo a stress Cardiolite test which was abnormal and suggestive of  considerable ischemia involving the anterior wall.  The test was  interpreted as moderate risk.  Given this as well as his progressive  symptoms despite medical management he was referred for cardiac  catheterization with possible stent implantation.  The patient was given  informed consent apprising the risks and benefits of doing so he  understood these risks and agreed to proceed.  Procedure in detail the patient was brought to the cardiac catheterization  laboratory and prepped and draped in the usual sterile fashion.  Adequate  anesthesia was obtained by infiltrating the right groin with local  lidocaine.  Using a modified Seldinger technique a 5 Qatari sheath was  placed in the right femoral artery.  A 5 Qatari JL4 catheter was used to  engage the ostium of the left main coronary artery and angiography was  performed in varying views using hand injection of contrast material.   After that a 5 Qatari JR4 catheter was used to engage the ostium of the  right coronary artery and angiography was again performed in varying views  using hand injection of contrast material.  At this point the patient was  given adequate heparinization to achieve an ACT greater than 200.  He  already been taking Plavix.  A 6 Qatari sheath was exchanged for the 5  Qatari sheath and a 6 Qatari XB 4.0 guiding catheter was used to engage  the  ostium of the left main coronary artery.  A BMW was placed  atraumatically in the distal portion of the LAD.  The lesion was  predilated with a 2.0 x 15 mm balloon.  A 3.0 x 12 mm Alpine drug-eluting  stent was then implanted at 12 em.  The balloon was reinflated to 12 em.   After this the stent was postdilated with a 3.5 mm x 12 mm noncompliant  balloon.  Inflation of 12 em was used in the distal portion of the stent  and inflation of 8 em was used approximately to adequately match both  ends of the stent to the vessel diameter.  After this orthogonal  angiography revealed an Result without evidence of dissection and BAY-3 flow in the distal  vascular bed.  The guidewire was withdrawn and orthogonal in Mirian's  repeated which confirmed the above-noted results.  The patient was  returned to the floor without evidence of complication.  Findings in detail: Left Main coronary artery: The left main coronary artery is a large vessel which trifurcates into the  LAD and ramus intermedius branch and the circumflex artery.  The left main  coronary artery is free of atherosclerotic disease.  Left anterior descending artery: The left anterior descending artery is a large vessel which reaches beyond  the apex the ventricle and gives rise to 2 diagonal branches the first of  which is small in the next of which is moderate in size.  There is a  previously implanted stent in the proximal portion of the LAD.  Just  proximal to this there is a severe 99% stenotic area.  This is the culprit  lesion.  The lesion length is 10 mm.  The vessel diameter is approximately  3.25 mm.  BAY flow prior to intervention is BAY 2 BAY flow  postintervention is BAY-3. Post intervention the lesion was reduced to a  0% residual stenosis.  Circumflex artery: The circumflex artery is a moderate-sized vessel which gives rise to one  branching obtuse marginal vessel.  The circumflex artery is free of  atherosclerotic disease.  Ramus intermedius  branch: The ramus intermedius branch is a small to moderate-sized vessel which is  free of after scrubbing disease.  Right coronary artery: The right coronary artery is a large vessel which gives rise to the  posterior descending artery and several posterior lateral branches.  The  right coronary artery is free of after scrubbing disease.  Final impression and plan: Overall it is my impression that Mr. Clay has undergone successful  percutaneous revascularization of the LAD.  I did review his prior films.   In retrospect I did not see anything that was overtly concerning and would  predict in-stent restenosis.  I suspect that he will do well however,  given his clinical history I will follow him closely over the next several  months.       Cayetano Jensen MD 11/21/2016  1:32 PM          Narrative · Right dominant coronary artery system with single vessel disease   involving the LAD  · Successful angioplasty and stenting of the proximal LAD.     Final impression:  Right dominant coronary artery system with single vessel disease involving   the LAD  Successful angioplasty and stenting of the LAD    Procedures performed:  Coronary angiography  Angioplasty and stenting of the LAD    History of present illness:  Christopher is a very pleasant 76-year-old gentleman who presents with a   history of known coronary artery disease status post stent implantation to   his proximal LAD.  He had been doing well however had recurrent chest   discomfort which was initially managed medically.  He eventually did   undergo a stress Cardiolite test which was abnormal and suggestive of   considerable ischemia involving the anterior wall.  The test was   interpreted as moderate risk.  Given this as well as his progressive   symptoms despite medical management he was referred for cardiac   catheterization with possible stent implantation.  The patient was given   informed consent apprising the risks and benefits of doing so he    understood these risks and agreed to proceed.    Procedure in detail the patient was brought to the cardiac catheterization   laboratory and prepped and draped in the usual sterile fashion.  Adequate   anesthesia was obtained by infiltrating the right groin with local   lidocaine.  Using a modified Seldinger technique a 5 Panamanian sheath was   placed in the right femoral artery.  A 5 Panamanian JL4 catheter was used to   engage the ostium of the left main coronary artery and angiography was   performed in varying views using hand injection of contrast material.    After that a 5 Panamanian JR4 catheter was used to engage the ostium of the   right coronary artery and angiography was again performed in varying views   using hand injection of contrast material.  At this point the patient was   given adequate heparinization to achieve an ACT greater than 200.  He   already been taking Plavix.  A 6 Panamanian sheath was exchanged for the 5   Panamanian sheath and a 6 Panamanian XB 4.0 guiding catheter was used to engage   the ostium of the left main coronary artery.  A BMW was placed   atraumatically in the distal portion of the LAD.  The lesion was   predilated with a 2.0 x 15 mm balloon.  A 3.0 x 12 mm Alpine drug-eluting   stent was then implanted at 12 em.  The balloon was reinflated to 12 em.    After this the stent was postdilated with a 3.5 mm x 12 mm noncompliant   balloon.  Inflation of 12 em was used in the distal portion of the stent   and inflation of 8 em was used approximately to adequately match both   ends of the stent to the vessel diameter.  After this orthogonal   angiography revealed an  Result without evidence of dissection and BAY-3 flow in the distal   vascular bed.  The guidewire was withdrawn and orthogonal in Mirian's   repeated which confirmed the above-noted results.  The patient was   returned to the floor without evidence of complication.    Findings in detail:  Left Main coronary artery:  The left main  coronary artery is a large vessel which trifurcates into the   LAD and ramus intermedius branch and the circumflex artery.  The left main   coronary artery is free of atherosclerotic disease.    Left anterior descending artery:  The left anterior descending artery is a large vessel which reaches beyond   the apex the ventricle and gives rise to 2 diagonal branches the first of   which is small in the next of which is moderate in size.  There is a   previously implanted stent in the proximal portion of the LAD.  Just   proximal to this there is a severe 99% stenotic area.  This is the culprit   lesion.  The lesion length is 10 mm.  The vessel diameter is approximately   3.25 mm.  BAY flow prior to intervention is BAY 2 BAY flow   postintervention is BAY-3.    Circumflex artery:  The circumflex artery is a moderate-sized vessel which gives rise to one   branching obtuse marginal vessel.  The circumflex artery is free of   atherosclerotic disease.    Ramus intermedius branch:  The ramus intermedius branch is a small to moderate-sized vessel which is   free of after scrubbing disease.    Right coronary artery:  The right coronary artery is a large vessel which gives rise to the   posterior descending artery and several posterior lateral branches.  The   right coronary artery is free of after scrubbing disease.    Final impression and plan:  Overall it is my impression that Mr. Clay has undergone successful   percutaneous revascularization of the LAD.  I did review his prior films.    In retrospect I did not see anything that was overtly concerning and would   predict in-stent restenosis.  I suspect that he will do well however,   given his clinical history I will follow him closely over the next several   months.               A/P:CAD: stable and asymptomatic. Cont ASA and statin. Stop coreg to see if HR improves and stamina improves. Monitor BP although normal today.     Venous disease : stable.    F/u 6 months.          Thank you for allowing me to participate in the care of Christopher Clay. Feel free to contact me directly with any further questions or concerns.

## 2019-03-06 ENCOUNTER — TRANSCRIBE ORDERS (OUTPATIENT)
Dept: ADMINISTRATIVE | Facility: HOSPITAL | Age: 79
End: 2019-03-06

## 2019-03-06 ENCOUNTER — LAB (OUTPATIENT)
Dept: LAB | Facility: HOSPITAL | Age: 79
End: 2019-03-06

## 2019-03-06 DIAGNOSIS — E88.1 LIPODYSTROPHY: ICD-10-CM

## 2019-03-06 DIAGNOSIS — I10 BENIGN HYPERTENSION: ICD-10-CM

## 2019-03-06 DIAGNOSIS — E78.2 MIXED HYPERLIPIDEMIA: ICD-10-CM

## 2019-03-06 DIAGNOSIS — I25.10 DISEASE OF CARDIOVASCULAR SYSTEM: ICD-10-CM

## 2019-03-06 DIAGNOSIS — E78.2 MIXED HYPERLIPIDEMIA: Primary | ICD-10-CM

## 2019-03-06 LAB
25(OH)D3 SERPL-MCNC: 76 NG/ML
ALBUMIN SERPL-MCNC: 4 G/DL (ref 3.4–4.8)
ALBUMIN/GLOB SERPL: 1.5 G/DL (ref 1.5–2.5)
ALP SERPL-CCNC: 144 U/L (ref 40–129)
ALT SERPL W P-5'-P-CCNC: 25 U/L (ref 10–44)
ANION GAP SERPL CALCULATED.3IONS-SCNC: 6.8 MMOL/L (ref 3.6–11.2)
AST SERPL-CCNC: 24 U/L (ref 10–34)
BASOPHILS # BLD AUTO: 0.01 10*3/MM3 (ref 0–0.3)
BASOPHILS NFR BLD AUTO: 0.2 % (ref 0–2)
BILIRUB SERPL-MCNC: 0.5 MG/DL (ref 0.2–1.8)
BUN BLD-MCNC: 15 MG/DL (ref 7–21)
BUN/CREAT SERPL: 14.6 (ref 7–25)
CALCIUM SPEC-SCNC: 8.6 MG/DL (ref 7.7–10)
CHLORIDE SERPL-SCNC: 103 MMOL/L (ref 99–112)
CHOLEST SERPL-MCNC: 105 MG/DL (ref 0–200)
CO2 SERPL-SCNC: 27.2 MMOL/L (ref 24.3–31.9)
CREAT BLD-MCNC: 1.03 MG/DL (ref 0.43–1.29)
DEPRECATED RDW RBC AUTO: 42.7 FL (ref 37–54)
EOSINOPHIL # BLD AUTO: 0.29 10*3/MM3 (ref 0–0.7)
EOSINOPHIL NFR BLD AUTO: 4.4 % (ref 0–7)
ERYTHROCYTE [DISTWIDTH] IN BLOOD BY AUTOMATED COUNT: 13.8 % (ref 11.5–14.5)
GFR SERPL CREATININE-BSD FRML MDRD: 70 ML/MIN/1.73
GLOBULIN UR ELPH-MCNC: 2.7 GM/DL
GLUCOSE BLD-MCNC: 106 MG/DL (ref 70–110)
HBA1C MFR BLD: 5.8 % (ref 4.5–5.7)
HCT VFR BLD AUTO: 43.8 % (ref 42–52)
HDLC SERPL-MCNC: 35 MG/DL (ref 60–100)
HGB BLD-MCNC: 14.8 G/DL (ref 14–18)
IMM GRANULOCYTES # BLD AUTO: 0.02 10*3/MM3 (ref 0–0.03)
IMM GRANULOCYTES NFR BLD AUTO: 0.3 % (ref 0–0.5)
LDLC SERPL CALC-MCNC: 55 MG/DL (ref 0–100)
LDLC/HDLC SERPL: 1.58 {RATIO}
LYMPHOCYTES # BLD AUTO: 2.3 10*3/MM3 (ref 1–3)
LYMPHOCYTES NFR BLD AUTO: 35.1 % (ref 16–46)
MCH RBC QN AUTO: 28.5 PG (ref 27–33)
MCHC RBC AUTO-ENTMCNC: 33.8 G/DL (ref 33–37)
MCV RBC AUTO: 84.4 FL (ref 80–94)
MONOCYTES # BLD AUTO: 0.62 10*3/MM3 (ref 0.1–0.9)
MONOCYTES NFR BLD AUTO: 9.5 % (ref 0–12)
NEUTROPHILS # BLD AUTO: 3.31 10*3/MM3 (ref 1.4–6.5)
NEUTROPHILS NFR BLD AUTO: 50.5 % (ref 40–75)
OSMOLALITY SERPL CALC.SUM OF ELEC: 275.1 MOSM/KG (ref 273–305)
PLATELET # BLD AUTO: 191 10*3/MM3 (ref 130–400)
PMV BLD AUTO: 11.1 FL (ref 6–10)
POTASSIUM BLD-SCNC: 3.6 MMOL/L (ref 3.5–5.3)
PROT SERPL-MCNC: 6.7 G/DL (ref 6–8)
RBC # BLD AUTO: 5.19 10*6/MM3 (ref 4.7–6.1)
SODIUM BLD-SCNC: 137 MMOL/L (ref 135–153)
TRIGL SERPL-MCNC: 73 MG/DL (ref 0–150)
VLDLC SERPL-MCNC: 14.6 MG/DL
WBC NRBC COR # BLD: 6.55 10*3/MM3 (ref 4.5–12.5)

## 2019-03-06 PROCEDURE — 36415 COLL VENOUS BLD VENIPUNCTURE: CPT

## 2019-03-06 PROCEDURE — 82306 VITAMIN D 25 HYDROXY: CPT

## 2019-03-06 PROCEDURE — 80053 COMPREHEN METABOLIC PANEL: CPT

## 2019-03-06 PROCEDURE — 80061 LIPID PANEL: CPT

## 2019-03-06 PROCEDURE — 83036 HEMOGLOBIN GLYCOSYLATED A1C: CPT

## 2019-03-06 PROCEDURE — 85025 COMPLETE CBC W/AUTO DIFF WBC: CPT

## 2019-05-10 ENCOUNTER — OFFICE VISIT (OUTPATIENT)
Dept: CARDIOLOGY | Facility: CLINIC | Age: 79
End: 2019-05-10

## 2019-05-10 VITALS
WEIGHT: 222 LBS | DIASTOLIC BLOOD PRESSURE: 70 MMHG | OXYGEN SATURATION: 98 % | BODY MASS INDEX: 32.88 KG/M2 | HEIGHT: 69 IN | SYSTOLIC BLOOD PRESSURE: 134 MMHG | HEART RATE: 60 BPM

## 2019-05-10 DIAGNOSIS — I25.10 CORONARY ARTERY DISEASE INVOLVING NATIVE CORONARY ARTERY OF NATIVE HEART WITHOUT ANGINA PECTORIS: Primary | ICD-10-CM

## 2019-05-10 DIAGNOSIS — E78.2 MIXED HYPERLIPIDEMIA: ICD-10-CM

## 2019-05-10 DIAGNOSIS — I10 ESSENTIAL HYPERTENSION: ICD-10-CM

## 2019-05-10 PROCEDURE — 99213 OFFICE O/P EST LOW 20 MIN: CPT | Performed by: NURSE PRACTITIONER

## 2019-05-10 NOTE — PROGRESS NOTES
"Subjective     Chief Complaint: Coronary Artery Disease; Hypertension; and Hyperlipidemia    History of Present Illness   Christopher Clay is a 78 y.o. male who presents with a past medical history significant for ASCVD, with PCI GRAEME to the LAD in 2016, hypertension, dyslipidemia and obesity.  He presents today for his follow up.    He denies chest pain, palpitations, shortness of breath.  He denies syncope near syncopal event.  He denies PND and orthopnea.  He states that he has been doing well.  He has been compliant with his medications.      Current Outpatient Medications:   •  atorvastatin (LIPITOR) 40 MG tablet, Take 1 tablet by mouth Daily. 1 TABLET AT BEDTIME, Disp: 90 tablet, Rfl: 3  •  Cholecalciferol (VITAMIN D) 2000 UNITS tablet, Take 2,000 Units by mouth Daily., Disp: , Rfl:   •  clopidogrel (PLAVIX) 75 MG tablet, Take 1 tablet by mouth Daily., Disp: 90 tablet, Rfl: 3  •  lisinopril-hydrochlorothiazide (PRINZIDE,ZESTORETIC) 20-12.5 MG per tablet, TAKE 2 TABLETS BY MOUTH DAILY (Patient taking differently: TAKE 1 TABLET BY MOUTH TWICE DAILY), Disp: 180 tablet, Rfl: 5     The following portions of the patient's history were reviewed and updated as appropriate: allergies, current medications, past family history, past medical history, past social history, past surgical history and problem list.    Review of Systems   Constitutional: Negative for fatigue.   Respiratory: Negative for shortness of breath.    Cardiovascular: Negative for chest pain, palpitations and leg swelling.   Gastrointestinal: Negative for blood in stool.   Neurological: Negative for dizziness, syncope, weakness and light-headedness.   Hematological: Bruises/bleeds easily.   All other systems reviewed and are negative.      Objective     /70 (BP Location: Left arm, Patient Position: Sitting, Cuff Size: Adult)   Pulse 60   Ht 175.3 cm (69\")   Wt 101 kg (222 lb)   SpO2 98%   BMI 32.78 kg/m²     Physical Exam   Constitutional: He is " oriented to person, place, and time. He appears well-developed and well-nourished.   HENT:   Head: Normocephalic and atraumatic.   Eyes: Pupils are equal, round, and reactive to light.   Neck: No JVD present.   Cardiovascular: Normal rate, regular rhythm and intact distal pulses. Exam reveals no gallop and no friction rub.   No murmur heard.  Pulmonary/Chest: Effort normal and breath sounds normal. No respiratory distress. He has no wheezes. He has no rales.   Neurological: He is alert and oriented to person, place, and time.   Skin: Skin is warm and dry.   Psychiatric: He has a normal mood and affect.   Vitals reviewed.      Procedures    Assessment/Plan       Christopher was seen today for coronary artery disease, hypertension and hyperlipidemia.    Diagnoses and all orders for this visit:    Assessment:    1. Coronary artery disease, status post PCI GRAEME to the LAD in 2016  2. Essential hypertension  3. Dyslipidemia  4. Obesity    Plan:    1. Patient to continue guideline directed medical therapy including Plavix, atorvastatin, and lisinopril/hydrochlorothiazide.  2. Risk factor modification: Diet and exercise discussed with the patient.  Educational materials given.  3. Return to clinic in 6 months, sooner for any worsening or concerning symptoms.             MACKENZIE Milligan

## 2019-05-10 NOTE — PATIENT INSTRUCTIONS
Mediterranean Diet  A Mediterranean diet refers to food and lifestyle choices that are based on the traditions of countries located on the [place]. This way of eating has been shown to help prevent certain conditions and improve outcomes for people who have chronic diseases, like kidney disease and heart disease.  What are tips for following this plan?  Lifestyle  · Cook and eat meals together with your family, when possible.  · Drink enough fluid to keep your urine clear or pale yellow.  · Be physically active every day. This includes:  ? Aerobic exercise like running or swimming.  ? Leisure activities like gardening, walking, or housework.  · Get 7-8 hours of sleep each night.  · If recommended by your health care provider, drink red wine in moderation. This means 1 glass a day for nonpregnant women and 2 glasses a day for men. A glass of wine equals 5 oz (150 mL).  Reading food labels  · Check the serving size of packaged foods. For foods such as rice and pasta, the serving size refers to the amount of cooked product, not dry.  · Check the total fat in packaged foods. Avoid foods that have saturated fat or trans fats.  · Check the ingredients list for added sugars, such as corn syrup.  Shopping  · At the grocery store, buy most of your food from the areas near the walls of the store. This includes:  ? Fresh fruits and vegetables (produce).  ? Grains, beans, nuts, and seeds. Some of these may be available in unpackaged forms or large amounts (in bulk).  ? Fresh seafood.  ? Poultry and eggs.  ? Low-fat dairy products.  · Buy whole ingredients instead of prepackaged foods.  · Buy fresh fruits and vegetables in-season from local farmers markets.  · Buy frozen fruits and vegetables in resealable bags.  · If you do not have access to quality fresh seafood, buy precooked frozen shrimp or canned fish, such as tuna, salmon, or sardines.  · Buy small amounts of raw or cooked vegetables, salads, or olives from the  deli or salad bar at your store.  · Stock your pantry so you always have certain foods on hand, such as olive oil, canned tuna, canned tomatoes, rice, pasta, and beans.  Cooking  · Cook foods with extra-virgin olive oil instead of using butter or other vegetable oils.  · Have meat as a side dish, and have vegetables or grains as your main dish. This means having meat in small portions or adding small amounts of meat to foods like pasta or stew.  · Use beans or vegetables instead of meat in common dishes like chili or lasagna.  · [place] with different cooking methods. Try roasting or broiling vegetables instead of steaming or sautéeing them.  · Add frozen vegetables to soups, stews, pasta, or rice.  · Add nuts or seeds for added healthy fat at each meal. You can add these to yogurt, salads, or vegetable dishes.  · Marinate fish or vegetables using olive oil, lemon juice, garlic, and fresh herbs.  Meal planning  · Plan to eat 1 vegetarian meal one day each week. Try to work up to 2 vegetarian meals, if possible.  · Eat seafood 2 or more times a week.  · Have healthy snacks readily available, such as:  ? Vegetable sticks with hummus.  ? Greek yogurt.  ? Fruit and nut trail mix.  · Eat balanced meals throughout the week. This includes:  ? Fruit: 2-3 servings a day  ? Vegetables: 4-5 servings a day  ? Low-fat dairy: 2 servings a day  ? Fish, poultry, or lean meat: 1 serving a day  ? Beans and legumes: 2 or more servings a week  ? Nuts and seeds: 1-2 servings a day  ? Whole grains: 6-8 servings a day  ? Extra-virgin olive oil: 3-4 servings a day  · Limit red meat and sweets to only a few servings a month  What are my food choices?  · Mediterranean diet  ? Recommended  ? Grains: Whole-grain pasta. Brown rice. Bulgar wheat. Polenta. Couscous. Whole-wheat bread. Oatmeal. Quinoa.  ? Vegetables: Artichokes. Beets. Broccoli. Cabbage. Carrots. Eggplant. Green beans. Chard. Kale. Spinach. Onions. Leeks. Peas. Squash.  Tomatoes. Peppers. Radishes.  ? Fruits: Apples. Apricots. Avocado. Berries. Bananas. Cherries. Dates. Figs. Grapes. [place]. Melon. Oranges. Peaches. Plums. Pomegranate.  ? Meats and other protein foods: Beans. Almonds. Sunflower seeds. Pine nuts. Peanuts. Cod. [place]. Scallops. Shrimp. Tuna. Tilapia. Clams. Oysters. Eggs.  ? Dairy: Low-fat milk. Cheese. Greek yogurt.  ? Beverages: Water. Red wine. Herbal tea.  ? Fats and oils: Extra virgin olive oil. Avocado oil. Grape seed oil.  ? Sweets and desserts: Greek yogurt with honey. Baked apples. Poached pears. Trail mix.  ? Seasoning and other foods: Basil. Cilantro. Coriander. Cumin. Mint. Parsley. [place]. Rosemary. Tarragon. Garlic. Oregano. Thyme. Pepper. Balsalmic vinegar. Tahini. Hummus. Tomato sauce. Olives. Mushrooms.  ? Limit these  ? Grains: Prepackaged pasta or rice dishes. Prepackaged cereal with added sugar.  ? Vegetables: Deep fried potatoes (french fries).  ? Fruits: Fruit canned in syrup.  ? Meats and other protein foods: Beef. Pork. Lamb. Poultry with skin. Hot dogs. Vasques.  ? Dairy: Ice cream. Sour cream. Whole milk.  ? Beverages: Juice. Sugar-sweetened soft drinks. Beer. Liquor and spirits.  ? Fats and oils: Butter. Canola oil. Vegetable oil. Beef fat (tallow). Lard.  ? Sweets and desserts: Cookies. Cakes. Pies. Candy.  ? Seasoning and other foods: Mayonnaise. Premade sauces and marinades.  ? The items listed may not be a complete list. Talk with your dietitian about what dietary choices are right for you.  Summary  · The Mediterranean diet includes both food and lifestyle choices.  · Eat a variety of fresh fruits and vegetables, beans, nuts, seeds, and whole grains.  · Limit the amount of red meat and sweets that you eat.  · Talk with your health care provider about whether it is safe for you to drink red wine in moderation. This means 1 glass a day for nonpregnant women and 2 glasses a day for men. A glass of wine equals 5 oz (150 mL).  This information  is not intended to replace advice given to you by your health care provider. Make sure you discuss any questions you have with your health care provider.  Document Released: 08/10/2017 Document Revised: 09/12/2017 Document Reviewed: 08/10/2017  [employer] Interactive Patient Education © 2019 [employer]

## 2019-05-23 RX ORDER — CLOPIDOGREL BISULFATE 75 MG/1
75 TABLET ORAL DAILY
Qty: 90 TABLET | Refills: 3 | Status: SHIPPED | OUTPATIENT
Start: 2019-05-23 | End: 2021-10-07 | Stop reason: SDUPTHER

## 2019-05-23 RX ORDER — ATORVASTATIN CALCIUM 40 MG/1
40 TABLET, FILM COATED ORAL DAILY
Qty: 90 TABLET | Refills: 3 | Status: SHIPPED | OUTPATIENT
Start: 2019-05-23 | End: 2019-11-15 | Stop reason: ALTCHOICE

## 2019-09-09 ENCOUNTER — TRANSCRIBE ORDERS (OUTPATIENT)
Dept: ADMINISTRATIVE | Facility: HOSPITAL | Age: 79
End: 2019-09-09

## 2019-09-09 ENCOUNTER — LAB (OUTPATIENT)
Dept: LAB | Facility: HOSPITAL | Age: 79
End: 2019-09-09

## 2019-09-09 DIAGNOSIS — I25.10 DISEASE OF CARDIOVASCULAR SYSTEM: ICD-10-CM

## 2019-09-09 DIAGNOSIS — E55.9 VITAMIN D DEFICIENCY: ICD-10-CM

## 2019-09-09 DIAGNOSIS — E88.81 DYSMETABOLIC SYNDROME X: ICD-10-CM

## 2019-09-09 DIAGNOSIS — E78.2 MIXED HYPERLIPIDEMIA: ICD-10-CM

## 2019-09-09 DIAGNOSIS — I10 BENIGN HYPERTENSION: ICD-10-CM

## 2019-09-09 DIAGNOSIS — I10 BENIGN HYPERTENSION: Primary | ICD-10-CM

## 2019-09-09 LAB
25(OH)D3 SERPL-MCNC: 98.9 NG/ML (ref 30–100)
ALBUMIN SERPL-MCNC: 3.9 G/DL (ref 3.5–5.2)
ALBUMIN/GLOB SERPL: 1.6 G/DL
ALP SERPL-CCNC: 128 U/L (ref 39–117)
ALT SERPL W P-5'-P-CCNC: 18 U/L (ref 1–41)
ANION GAP SERPL CALCULATED.3IONS-SCNC: 11.6 MMOL/L (ref 5–15)
AST SERPL-CCNC: 21 U/L (ref 1–40)
BILIRUB SERPL-MCNC: 0.6 MG/DL (ref 0.2–1.2)
BUN BLD-MCNC: 15 MG/DL (ref 8–23)
BUN/CREAT SERPL: 14.2 (ref 7–25)
CALCIUM SPEC-SCNC: 8.4 MG/DL (ref 8.6–10.5)
CHLORIDE SERPL-SCNC: 99 MMOL/L (ref 98–107)
CHOLEST SERPL-MCNC: 101 MG/DL (ref 0–200)
CO2 SERPL-SCNC: 26.4 MMOL/L (ref 22–29)
CREAT BLD-MCNC: 1.06 MG/DL (ref 0.76–1.27)
GFR SERPL CREATININE-BSD FRML MDRD: 68 ML/MIN/1.73
GLOBULIN UR ELPH-MCNC: 2.4 GM/DL
GLUCOSE BLD-MCNC: 112 MG/DL (ref 65–99)
HBA1C MFR BLD: 6.04 % (ref 4.8–5.6)
HDLC SERPL-MCNC: 28 MG/DL (ref 40–60)
LDLC SERPL CALC-MCNC: 50 MG/DL (ref 0–100)
LDLC/HDLC SERPL: 1.79 {RATIO}
POTASSIUM BLD-SCNC: 3.8 MMOL/L (ref 3.5–5.2)
PROT SERPL-MCNC: 6.3 G/DL (ref 6–8.5)
PSA SERPL-MCNC: 0.49 NG/ML (ref 0–4)
SODIUM BLD-SCNC: 137 MMOL/L (ref 136–145)
TRIGL SERPL-MCNC: 115 MG/DL (ref 0–150)
VLDLC SERPL-MCNC: 23 MG/DL (ref 5–40)

## 2019-09-09 PROCEDURE — 36415 COLL VENOUS BLD VENIPUNCTURE: CPT

## 2019-09-09 PROCEDURE — G0103 PSA SCREENING: HCPCS

## 2019-09-09 PROCEDURE — 80061 LIPID PANEL: CPT

## 2019-09-09 PROCEDURE — 80053 COMPREHEN METABOLIC PANEL: CPT

## 2019-09-09 PROCEDURE — 83036 HEMOGLOBIN GLYCOSYLATED A1C: CPT

## 2019-09-09 PROCEDURE — 82306 VITAMIN D 25 HYDROXY: CPT

## 2019-09-17 ENCOUNTER — TELEPHONE (OUTPATIENT)
Dept: CARDIOLOGY | Facility: CLINIC | Age: 79
End: 2019-09-17

## 2019-09-17 NOTE — TELEPHONE ENCOUNTER
Christopher has come by the office today asking about his Atorvastatin causing leg cramps at night time. His PCP has advised him to stop taking it however, he would like to get Chari's opinion on this before stopping it cold turkey.      I have discussed with MACKENZIE Milligan, who states he should stop taking the atorvastatin for 1 week to see if the cramps improve after 2-3 days. If the cramping continues he should start taking CoQ 10 + the Atorvastatin. If the cramping has stopped (due to not taking the atorvatstatin) he should let us know and we will change his medication to something else.    I have called Christopher to let him know exactly what Chari had said and he states that he will let us know in about a week how he has done since stopping the Atorvastatin. I told him that would be fine.

## 2019-11-15 ENCOUNTER — OFFICE VISIT (OUTPATIENT)
Dept: CARDIOLOGY | Facility: CLINIC | Age: 79
End: 2019-11-15

## 2019-11-15 VITALS
WEIGHT: 225 LBS | HEART RATE: 70 BPM | OXYGEN SATURATION: 97 % | DIASTOLIC BLOOD PRESSURE: 90 MMHG | SYSTOLIC BLOOD PRESSURE: 157 MMHG | BODY MASS INDEX: 33.33 KG/M2 | HEIGHT: 69 IN

## 2019-11-15 DIAGNOSIS — E78.2 MIXED HYPERLIPIDEMIA: ICD-10-CM

## 2019-11-15 DIAGNOSIS — I25.10 CORONARY ARTERY DISEASE INVOLVING NATIVE CORONARY ARTERY OF NATIVE HEART WITHOUT ANGINA PECTORIS: Primary | ICD-10-CM

## 2019-11-15 DIAGNOSIS — I10 ESSENTIAL HYPERTENSION: ICD-10-CM

## 2019-11-15 PROCEDURE — 99213 OFFICE O/P EST LOW 20 MIN: CPT | Performed by: NURSE PRACTITIONER

## 2019-11-15 RX ORDER — ROSUVASTATIN CALCIUM 20 MG/1
20 TABLET, COATED ORAL DAILY
Qty: 30 TABLET | Refills: 11 | Status: SHIPPED | OUTPATIENT
Start: 2019-11-15 | End: 2020-09-17

## 2019-11-15 NOTE — PROGRESS NOTES
Subjective     Chief Complaint: Coronary Artery Disease; Hypertension; and Hyperlipidemia    History of Present Illness   Christopher Clay is a 79 y.o. male who presents  with a past medical history significant for ASCVD, with PCI GRAEME to the LAD in 2016, hypertension, dyslipidemia and obesity.  He presents today for his follow up.    Mr. Clay denies chest pain, palpitations, lower extremity swelling.  He denies shortness of breath and dyspnea on exertion.  He denies PND and orthopnea.  He states that he has been doing well.  He participates in regular physical exercise.  He was chopping wood on Wednesday, stacking it and preparing for winter.  He experienced no chest pain during his exertion.    He states that he was having some difficulty with lower extremity muscle aches and pains.  This was interfering with his sleep.  He spoke with his PCP and his atorvastatin was discontinued.  Since that time there has been of resolution of his symptoms.    He states his blood pressures at home usually run in the 130s and 140s systolically.        Current Outpatient Medications:   •  Cholecalciferol (VITAMIN D) 2000 UNITS tablet, Take 2,000 Units by mouth Daily., Disp: , Rfl:   •  clopidogrel (PLAVIX) 75 MG tablet, Take 1 tablet by mouth Daily., Disp: 90 tablet, Rfl: 3  •  lisinopril-hydrochlorothiazide (PRINZIDE,ZESTORETIC) 20-12.5 MG per tablet, TAKE 2 TABLETS BY MOUTH DAILY (Patient taking differently: TAKE 1 TABLET BY MOUTH TWICE DAILY), Disp: 180 tablet, Rfl: 5  •  rosuvastatin (CRESTOR) 20 MG tablet, Take 1 tablet by mouth Daily., Disp: 30 tablet, Rfl: 11     On this date:  The following portions of the patient's history were reviewed and updated as appropriate: allergies, current medications, past family history, past medical history, past social history, past surgical history and problem list.    Review of Systems   Constitution: Negative for decreased appetite, weakness, malaise/fatigue, weight gain and weight loss.  "  Cardiovascular: Negative for chest pain, claudication, dyspnea on exertion, irregular heartbeat, leg swelling, near-syncope, orthopnea, palpitations, paroxysmal nocturnal dyspnea and syncope.   Respiratory: Negative for cough and shortness of breath.    Hematologic/Lymphatic: Does not bruise/bleed easily.   Neurological: Negative for dizziness and light-headedness.         Objective     /90 (BP Location: Left arm, Patient Position: Sitting, Cuff Size: Adult)   Pulse 70   Ht 175.3 cm (69\")   Wt 102 kg (225 lb)   SpO2 97%   BMI 33.23 kg/m²     Physical Exam   Constitutional: He is oriented to person, place, and time. He appears well-developed and well-nourished.   HENT:   Head: Normocephalic and atraumatic.   Eyes: Pupils are equal, round, and reactive to light.   Neck: No JVD present.   Cardiovascular: Normal rate, regular rhythm and intact distal pulses. Exam reveals no gallop and no friction rub.   No murmur heard.  Pulmonary/Chest: Effort normal and breath sounds normal. No respiratory distress. He has no wheezes. He has no rales.   Neurological: He is alert and oriented to person, place, and time.   Skin: Skin is warm and dry.   Psychiatric: He has a normal mood and affect.   Vitals reviewed.      Procedures      Assessment/Plan       Christopher was seen today for coronary artery disease, hypertension and hyperlipidemia.    Diagnoses and all orders for this visit:      Assessment:  1. Coronary artery disease  2. Dyslipidemia, 9/9/2019 total cholesterol 101, triglycerides 115, and LDL cholesterol 50  3. Essential hypertension  4. Obesity      Plan:  1. Results reviewed with patient: Labs from September 2019  2. Pt to continue guideline directed medical therapy for CAD: Plavix, lisinopril.  The patient has been instructed to restart statin.  Rosuvastatin prescription was given.  Patient was advised to take co-Q10 along with the rosuvastatin.  3. Risk factor modification: Patient counseled regarding diet " and exercise.  Recommended DASH diet for increased blood pressure control. Handouts given.  4. Return to clinic in 6 months, sooner for any worsening or concerning symptoms.      Return in about 6 months (around 5/15/2020).      MACKENZIE Milligan

## 2019-11-15 NOTE — PATIENT INSTRUCTIONS
"You may add Co Q 10, which is available over the counter.        DASH Eating Plan  DASH stands for \"Dietary Approaches to Stop Hypertension.\" The DASH eating plan is a healthy eating plan that has been shown to reduce high blood pressure (hypertension). It may also reduce your risk for type 2 diabetes, heart disease, and stroke. The DASH eating plan may also help with weight loss.  What are tips for following this plan?    General guidelines  · Avoid eating more than 2,300 mg (milligrams) of salt (sodium) a day. If you have hypertension, you may need to reduce your sodium intake to 1,500 mg a day.  · Limit alcohol intake to no more than 1 drink a day for nonpregnant women and 2 drinks a day for men. One drink equals 12 oz of beer, 5 oz of wine, or 1½ oz of hard liquor.  · Work with your health care provider to maintain a healthy body weight or to lose weight. Ask what an ideal weight is for you.  · Get at least 30 minutes of exercise that causes your heart to beat faster (aerobic exercise) most days of the week. Activities may include walking, swimming, or biking.  · Work with your health care provider or diet and nutrition specialist (dietitian) to adjust your eating plan to your individual calorie needs.  Reading food labels    · Check food labels for the amount of sodium per serving. Choose foods with less than 5 percent of the Daily Value of sodium. Generally, foods with less than 300 mg of sodium per serving fit into this eating plan.  · To find whole grains, look for the word \"whole\" as the first word in the ingredient list.  Shopping  · Buy products labeled as \"low-sodium\" or \"no salt added.\"  · Buy fresh foods. Avoid canned foods and premade or frozen meals.  Cooking  · Avoid adding salt when cooking. Use salt-free seasonings or herbs instead of table salt or sea salt. Check with your health care provider or pharmacist before using salt substitutes.  · Do not zhang foods. Cook foods using healthy methods such " as baking, boiling, grilling, and broiling instead.  · Cook with heart-healthy oils, such as olive, canola, soybean, or sunflower oil.  Meal planning  · Eat a balanced diet that includes:  ? 5 or more servings of fruits and vegetables each day. At each meal, try to fill half of your plate with fruits and vegetables.  ? Up to 6-8 servings of whole grains each day.  ? Less than 6 oz of lean meat, poultry, or fish each day. A 3-oz serving of meat is about the same size as a deck of cards. One egg equals 1 oz.  ? 2 servings of low-fat dairy each day.  ? A serving of nuts, seeds, or beans 5 times each week.  ? Heart-healthy fats. Healthy fats called Omega-3 fatty acids are found in foods such as flaxseeds and coldwater fish, like sardines, salmon, and mackerel.  · Limit how much you eat of the following:  ? Canned or prepackaged foods.  ? Food that is high in trans fat, such as fried foods.  ? Food that is high in saturated fat, such as fatty meat.  ? Sweets, desserts, sugary drinks, and other foods with added sugar.  ? Full-fat dairy products.  · Do not salt foods before eating.  · Try to eat at least 2 vegetarian meals each week.  · Eat more home-cooked food and less restaurant, buffet, and fast food.  · When eating at a restaurant, ask that your food be prepared with less salt or no salt, if possible.  What foods are recommended?  The items listed may not be a complete list. Talk with your dietitian about what dietary choices are best for you.  Grains  Whole-grain or whole-wheat bread. Whole-grain or whole-wheat pasta. Brown rice. Oatmeal. Quinoa. [place]. Whole-grain and low-sodium cereals. [place] bread. Low-fat, low-sodium crackers. Whole-wheat flour tortillas.  Vegetables  Fresh or frozen vegetables (raw, steamed, roasted, or grilled). Low-sodium or reduced-sodium tomato and vegetable juice. Low-sodium or reduced-sodium tomato sauce and tomato paste. Low-sodium or reduced-sodium canned vegetables.  Fruits  All fresh,  dried, or frozen fruit. Canned fruit in natural juice (without added sugar).  Meat and other protein foods  Skinless chicken or turkey. Ground chicken or turkey. Pork with fat trimmed off. Fish and seafood. Egg whites. Dried beans, peas, or lentils. Unsalted nuts, nut butters, and seeds. Unsalted canned beans. Lean cuts of beef with fat trimmed off. Low-sodium, lean deli meat.  Dairy  Low-fat (1%) or fat-free (skim) milk. Fat-free, low-fat, or reduced-fat cheeses. Nonfat, low-sodium ricotta or cottage cheese. Low-fat or nonfat yogurt. Low-fat, low-sodium cheese.  Fats and oils  Soft margarine without trans fats. Vegetable oil. Low-fat, reduced-fat, or light mayonnaise and salad dressings (reduced-sodium). Canola, safflower, olive, soybean, and sunflower oils. Avocado.  Seasoning and other foods  Herbs. Spices. Seasoning mixes without salt. Unsalted popcorn and pretzels. Fat-free sweets.  What foods are not recommended?  The items listed may not be a complete list. Talk with your dietitian about what dietary choices are best for you.  Grains  Baked goods made with fat, such as croissants, muffins, or some breads. Dry pasta or rice meal packs.  Vegetables  Creamed or fried vegetables. Vegetables in a cheese sauce. Regular canned vegetables (not low-sodium or reduced-sodium). Regular canned tomato sauce and paste (not low-sodium or reduced-sodium). Regular tomato and vegetable juice (not low-sodium or reduced-sodium). Pickles. Olives.  Fruits  Canned fruit in a light or heavy syrup. Fried fruit. Fruit in cream or butter sauce.  Meat and other protein foods  Fatty cuts of meat. Ribs. Fried meat. [place]. Sausage. Bologna and other processed lunch meats. Salami. Fatback. Hotdogs. Bratwurst. Salted nuts and seeds. Canned beans with added salt. Canned or smoked fish. Whole eggs or egg yolks. Chicken or turkey with skin.  Dairy  Whole or 2% milk, cream, and half-and-half. Whole or full-fat cream cheese. Whole-fat or sweetened  yogurt. Full-fat cheese. Nondairy creamers. Whipped toppings. Processed cheese and cheese spreads.  Fats and oils  Butter. Stick margarine. Lard. Shortening. Ghee. [place] fat. Tropical oils, such as coconut, palm kernel, or palm oil.  Seasoning and other foods  Salted popcorn and pretzels. Onion salt, garlic salt, seasoned salt, table salt, and sea salt. Worcestershire sauce. Tartar sauce. Barbecue sauce. Teriyaki sauce. Soy sauce, including reduced-sodium. Steak sauce. Canned and packaged gravies. Fish sauce. Oyster sauce. Cocktail sauce. Horseradish that you find on the shelf. Ketchup. Mustard. Meat flavorings and tenderizers. Bouillon cubes. Hot sauce and Tabasco sauce. Premade or packaged marinades. Premade or packaged taco seasonings. Relishes. Regular salad dressings.  Where to find more information:  · National Heart, Lung, and Blood [place]: www.nhlbi.nih.gov  · American Heart Association: www.heart.org  Summary  · The DASH eating plan is a healthy eating plan that has been shown to reduce high blood pressure (hypertension). It may also reduce your risk for type 2 diabetes, heart disease, and stroke.  · With the DASH eating plan, you should limit salt (sodium) intake to 2,300 mg a day. If you have hypertension, you may need to reduce your sodium intake to 1,500 mg a day.  · When on the DASH eating plan, aim to eat more fresh fruits and vegetables, whole grains, lean proteins, low-fat dairy, and heart-healthy fats.  · Work with your health care provider or diet and nutrition specialist (dietitian) to adjust your eating plan to your individual calorie needs.  This information is not intended to replace advice given to you by your health care provider. Make sure you discuss any questions you have with your health care provider.  Document Released: 12/06/2012 Document Revised: 12/11/2017 Document Reviewed: 12/11/2017  [employer] Interactive Patient Education © 2019 [employer]

## 2020-09-17 ENCOUNTER — OFFICE VISIT (OUTPATIENT)
Dept: CARDIOLOGY | Facility: CLINIC | Age: 80
End: 2020-09-17

## 2020-09-17 VITALS
SYSTOLIC BLOOD PRESSURE: 126 MMHG | BODY MASS INDEX: 33.03 KG/M2 | DIASTOLIC BLOOD PRESSURE: 70 MMHG | HEIGHT: 69 IN | HEART RATE: 63 BPM | WEIGHT: 223 LBS | OXYGEN SATURATION: 92 %

## 2020-09-17 DIAGNOSIS — E78.2 MIXED HYPERLIPIDEMIA: ICD-10-CM

## 2020-09-17 DIAGNOSIS — I25.10 CORONARY ARTERY DISEASE INVOLVING NATIVE CORONARY ARTERY OF NATIVE HEART WITHOUT ANGINA PECTORIS: Primary | ICD-10-CM

## 2020-09-17 DIAGNOSIS — I10 ESSENTIAL HYPERTENSION: ICD-10-CM

## 2020-09-17 PROCEDURE — 99213 OFFICE O/P EST LOW 20 MIN: CPT | Performed by: NURSE PRACTITIONER

## 2020-09-17 RX ORDER — LISINOPRIL AND HYDROCHLOROTHIAZIDE 20; 12.5 MG/1; MG/1
1 TABLET ORAL 2 TIMES DAILY
COMMUNITY
End: 2021-10-07 | Stop reason: SDUPTHER

## 2020-09-17 RX ORDER — UBIDECARENONE 100 MG
100 CAPSULE ORAL DAILY
COMMUNITY

## 2020-09-17 NOTE — PROGRESS NOTES
"Subjective     Chief Complaint: Follow-up and Coronary Artery Disease    History of Present Illness   Christopher Clay is a 80 y.o. male who presentswith a past medical history significant for ASCVD, with PCI GRAEME to the LAD in 2016, hypertension, dyslipidemia and obesity.  He presents today for his follow up.    At today's visit Mr. Clay states that he is feeling well.  He denies any chest pain or palpitations.  He states that he does not have any difficulty with any bleeding or excessive bruising.    We did discuss at length his lack of statin use.  At this point he feels like the side effects are worse than any benefit it may have.  He is planning to have his labs drawn next week.    Current Outpatient Medications:   •  Cholecalciferol (VITAMIN D) 2000 UNITS tablet, Take 2,000 Units by mouth Daily., Disp: , Rfl:   •  clopidogrel (PLAVIX) 75 MG tablet, Take 1 tablet by mouth Daily., Disp: 90 tablet, Rfl: 3  •  coenzyme Q10 100 MG capsule, Take 100 mg by mouth Daily., Disp: , Rfl:   •  lisinopril-hydrochlorothiazide (PRINZIDE,ZESTORETIC) 20-12.5 MG per tablet, Take 1 tablet by mouth 2 (two) times a day., Disp: , Rfl:      On this date:  The following portions of the patient's history were reviewed and updated as appropriate: allergies, current medications, past family history, past medical history, past social history, past surgical history and problem list.    Review of Systems   Constitution: Negative for malaise/fatigue.   Cardiovascular: Negative for chest pain, dyspnea on exertion, irregular heartbeat, leg swelling, near-syncope, orthopnea, palpitations, paroxysmal nocturnal dyspnea and syncope.   Respiratory: Negative for shortness of breath.    Hematologic/Lymphatic: Does not bruise/bleed easily.   Neurological: Negative for dizziness, light-headedness and weakness.         Objective     /70 (BP Location: Right arm, Patient Position: Sitting, Cuff Size: Adult)   Pulse 63   Ht 175.3 cm (69\")   Wt 101 " kg (223 lb)   SpO2 92%   BMI 32.93 kg/m²     Physical Exam  Constitutional:       Appearance: He is well-developed.   HENT:      Head: Normocephalic and atraumatic.   Eyes:      Pupils: Pupils are equal, round, and reactive to light.   Neck:      Vascular: No JVD.   Cardiovascular:      Rate and Rhythm: Normal rate and regular rhythm.      Heart sounds: No murmur. No friction rub. No gallop.    Pulmonary:      Effort: Pulmonary effort is normal. No respiratory distress.      Breath sounds: Normal breath sounds. No wheezing or rales.   Skin:     General: Skin is warm and dry.   Neurological:      Mental Status: He is oriented to person, place, and time.         Procedures      Assessment/Plan       Christopher was seen today for follow-up and coronary artery disease.    Diagnoses and all orders for this visit:    Coronary artery disease involving native coronary artery of native heart without angina pectoris    Mixed hyperlipidemia    Essential hypertension          Plan of care was reviewed.  Medication changes were made as noted below.  Patient agreed with plan of care.      Coronary artery disease is stable.  Patient to continue Plavix, lisinopril with hydrochlorothiazide.    Lipid panel from 9/9/2019 shows total cholesterol of 101, triglycerides 115 and LDL cholesterol 50.  Patient plans to have labs drawn this week for his appointment with his primary care provider.      Hypertension is well controlled    Advance Care Planning   ACP discussion was held with the patient during this visit. Patient does not have an advance directive, information provided.    Return in about 6 months (around 3/17/2021).      MACKENZIE Milligan

## 2021-02-17 DIAGNOSIS — Z23 IMMUNIZATION DUE: ICD-10-CM

## 2021-03-17 NOTE — PROGRESS NOTES
"Chief Complaint  Coronary Artery Disease    Subjective          Christopher Clay presents to [facility] CARDIOLOGY for his usual follow up.    History of Present Illness     Patient denies any chest pain, palpitations, shortness of breath or dyspnea on exertion.  He states that he has been doing well.  He has recently had labs drawn at his primary care provider's office.    Blood pressures at home are usually in the 130 systolically    Objective     Vital Signs:   /69 (BP Location: Right arm, Patient Position: Sitting)   Pulse 72   Ht 175.3 cm (69\")   Wt 102 kg (224 lb 6.4 oz)   SpO2 96%   BMI 33.14 kg/m²       Physical Exam  Constitutional:       Appearance: Normal appearance. He is well-developed.   HENT:      Head: Normocephalic and atraumatic.   Cardiovascular:      Rate and Rhythm: Normal rate and regular rhythm.      Heart sounds: No murmur heard.   No friction rub. No gallop.    Pulmonary:      Effort: Pulmonary effort is normal. No respiratory distress.      Breath sounds: Normal breath sounds. No wheezing or rales.   Skin:     General: Skin is warm and dry.   Neurological:      Mental Status: He is alert and oriented to person, place, and time.   Psychiatric:         Mood and Affect: Mood normal.         Behavior: Behavior normal.          Result Review :            ECG 12 Lead    Date/Time: 3/18/2021 9:32 AM  Performed by: Chari Ramirez APRN  Authorized by: Chari Ramirez APRN   Comparison: compared with previous ECG from 3/16/2019  Similar to previous ECG  Comparison to previous ECG: Sinus bradycardia with first-degree AV block; septal MI with Q waves in leads V1 and V2  Rhythm: sinus rhythm  Ectopy: infrequent PVCs  Q waves: V1 and V2    Comments:                   Assessment and Plan    Problem List Items Addressed This Visit        Cardiac and Vasculature    Essential hypertension (Chronic)    Hyperlipidemia (Chronic)    Overview     · 9/9/2019 total cholesterol " 101, triglycerides 115, HDL 28, LDL 50         Coronary artery disease involving native coronary artery of native heart - Primary (Chronic)    Overview     · 6/30/2015 [facility] for STEMI: PCI GRAEME to proximal LAD; LV gram 45% with hypokinesis of the anterior wall  · 10/18/2016 stress MPI shows large size moderate to severe area of ischemia located in the anterior wall  · 11/1/2016 [facility] for abnormal stress: PCI GRAEME to the LAD         Relevant Orders    Adult Transthoracic Echo Complete W/ Cont if Necessary Per Protocol            Follow Up     Medications were reviewed with the patient. He does not want to take a statin medication because of its side effects.  I have explained to him the protective nature of statin medication and he continues to decline statin medication.    Will request labs from his PCP office.  He had his labs drawn yesterday.    Return in about 6 months (around 9/18/2021).  Patient was given instructions and counseling regarding his condition or for health maintenance advice. Please see specific information pulled into the AVS if appropriate.

## 2021-03-18 ENCOUNTER — OFFICE VISIT (OUTPATIENT)
Dept: CARDIOLOGY | Facility: CLINIC | Age: 81
End: 2021-03-18

## 2021-03-18 VITALS
BODY MASS INDEX: 33.24 KG/M2 | SYSTOLIC BLOOD PRESSURE: 143 MMHG | HEIGHT: 69 IN | DIASTOLIC BLOOD PRESSURE: 69 MMHG | WEIGHT: 224.4 LBS | OXYGEN SATURATION: 96 % | HEART RATE: 72 BPM

## 2021-03-18 DIAGNOSIS — I25.10 CORONARY ARTERY DISEASE INVOLVING NATIVE CORONARY ARTERY OF NATIVE HEART WITHOUT ANGINA PECTORIS: Primary | Chronic | ICD-10-CM

## 2021-03-18 DIAGNOSIS — I10 ESSENTIAL HYPERTENSION: Chronic | ICD-10-CM

## 2021-03-18 DIAGNOSIS — E78.2 MIXED HYPERLIPIDEMIA: Chronic | ICD-10-CM

## 2021-03-18 PROCEDURE — 99213 OFFICE O/P EST LOW 20 MIN: CPT | Performed by: NURSE PRACTITIONER

## 2021-03-18 PROCEDURE — 93000 ELECTROCARDIOGRAM COMPLETE: CPT | Performed by: NURSE PRACTITIONER

## 2021-04-07 ENCOUNTER — HOSPITAL ENCOUNTER (OUTPATIENT)
Dept: CARDIOLOGY | Facility: HOSPITAL | Age: 81
Discharge: HOME | End: 2021-04-07
Admitting: NURSE PRACTITIONER

## 2021-04-07 DIAGNOSIS — I25.10 CORONARY ARTERY DISEASE INVOLVING NATIVE CORONARY ARTERY OF NATIVE HEART WITHOUT ANGINA PECTORIS: ICD-10-CM

## 2021-04-07 LAB
BH CV ECHO MEAS - % IVS THICK: 48.9 %
BH CV ECHO MEAS - % LVPW THICK: 51.5 %
BH CV ECHO MEAS - ACS: 2.4 CM
BH CV ECHO MEAS - AO MAX PG: 7.4 MMHG
BH CV ECHO MEAS - AO MEAN PG: 4 MMHG
BH CV ECHO MEAS - AO ROOT AREA (BSA CORRECTED): 1.5
BH CV ECHO MEAS - AO ROOT AREA: 8.8 CM^2
BH CV ECHO MEAS - AO ROOT DIAM: 3.4 CM
BH CV ECHO MEAS - AO V2 MAX: 136 CM/SEC
BH CV ECHO MEAS - AO V2 MEAN: 85.1 CM/SEC
BH CV ECHO MEAS - AO V2 VTI: 29 CM
BH CV ECHO MEAS - BSA(HAYCOCK): 2.3 M^2
BH CV ECHO MEAS - BSA: 2.2 M^2
BH CV ECHO MEAS - BZI_BMI: 33.1 KILOGRAMS/M^2
BH CV ECHO MEAS - BZI_METRIC_HEIGHT: 175.3 CM
BH CV ECHO MEAS - BZI_METRIC_WEIGHT: 101.6 KG
BH CV ECHO MEAS - EDV(CUBED): 152.3 ML
BH CV ECHO MEAS - EDV(MOD-SP4): 53.9 ML
BH CV ECHO MEAS - EDV(TEICH): 137.7 ML
BH CV ECHO MEAS - EF(CUBED): 67.7 %
BH CV ECHO MEAS - EF(MOD-SP4): 53.1 %
BH CV ECHO MEAS - EF(TEICH): 58.7 %
BH CV ECHO MEAS - ESV(CUBED): 49.2 ML
BH CV ECHO MEAS - ESV(MOD-SP4): 25.3 ML
BH CV ECHO MEAS - ESV(TEICH): 56.8 ML
BH CV ECHO MEAS - FS: 31.4 %
BH CV ECHO MEAS - IVS/LVPW: 1
BH CV ECHO MEAS - IVSD: 1.2 CM
BH CV ECHO MEAS - IVSS: 1.8 CM
BH CV ECHO MEAS - LA DIMENSION: 3.1 CM
BH CV ECHO MEAS - LA/AO: 0.93
BH CV ECHO MEAS - LV DIASTOLIC VOL/BSA (35-75): 24.9 ML/M^2
BH CV ECHO MEAS - LV MASS(C)D: 249.3 GRAMS
BH CV ECHO MEAS - LV MASS(C)DI: 115 GRAMS/M^2
BH CV ECHO MEAS - LV MASS(C)S: 265.7 GRAMS
BH CV ECHO MEAS - LV MASS(C)SI: 122.6 GRAMS/M^2
BH CV ECHO MEAS - LV SYSTOLIC VOL/BSA (12-30): 11.7 ML/M^2
BH CV ECHO MEAS - LVIDD: 5.3 CM
BH CV ECHO MEAS - LVIDS: 3.7 CM
BH CV ECHO MEAS - LVLD AP4: 7 CM
BH CV ECHO MEAS - LVLS AP4: 6.9 CM
BH CV ECHO MEAS - LVOT AREA (M): 2.5 CM^2
BH CV ECHO MEAS - LVOT AREA: 2.5 CM^2
BH CV ECHO MEAS - LVOT DIAM: 1.8 CM
BH CV ECHO MEAS - LVPWD: 1.2 CM
BH CV ECHO MEAS - LVPWS: 1.8 CM
BH CV ECHO MEAS - MV A MAX VEL: 94.9 CM/SEC
BH CV ECHO MEAS - MV E MAX VEL: 67.9 CM/SEC
BH CV ECHO MEAS - MV E/A: 0.72
BH CV ECHO MEAS - PA ACC TIME: 0.12 SEC
BH CV ECHO MEAS - PA PR(ACCEL): 26.8 MMHG
BH CV ECHO MEAS - RAP SYSTOLE: 10 MMHG
BH CV ECHO MEAS - RVSP: 24.5 MMHG
BH CV ECHO MEAS - SI(AO): 117.9 ML/M^2
BH CV ECHO MEAS - SI(CUBED): 47.5 ML/M^2
BH CV ECHO MEAS - SI(MOD-SP4): 13.2 ML/M^2
BH CV ECHO MEAS - SI(TEICH): 37.3 ML/M^2
BH CV ECHO MEAS - SV(AO): 255.6 ML
BH CV ECHO MEAS - SV(CUBED): 103 ML
BH CV ECHO MEAS - SV(MOD-SP4): 28.6 ML
BH CV ECHO MEAS - SV(TEICH): 80.9 ML
BH CV ECHO MEAS - TR MAX VEL: 190.5 CM/SEC
MAXIMAL PREDICTED HEART RATE: 142 BPM
STRESS TARGET HR: 121 BPM

## 2021-04-07 PROCEDURE — 93306 TTE W/DOPPLER COMPLETE: CPT | Performed by: INTERNAL MEDICINE

## 2021-04-07 PROCEDURE — 93306 TTE W/DOPPLER COMPLETE: CPT

## 2021-04-09 ENCOUNTER — IMMUNIZATION (OUTPATIENT)
Dept: VACCINE CLINIC | Facility: HOSPITAL | Age: 81
End: 2021-04-09

## 2021-04-09 PROCEDURE — 0001A: CPT | Performed by: INTERNAL MEDICINE

## 2021-04-09 PROCEDURE — 91300 HC SARSCOV02 VAC 30MCG/0.3ML IM: CPT | Performed by: INTERNAL MEDICINE

## 2021-04-19 ENCOUNTER — TELEPHONE (OUTPATIENT)
Dept: CARDIOLOGY | Facility: CLINIC | Age: 81
End: 2021-04-19

## 2021-04-19 NOTE — TELEPHONE ENCOUNTER
----- Message from MACKENZIE Major sent at 4/15/2021  9:07 PM EDT -----  Please call patient.  Normal results.  Pumping function of the heart is very good.  And there are no problems with your heart valves everything looks good.    Thanks, Chari

## 2021-04-30 ENCOUNTER — IMMUNIZATION (OUTPATIENT)
Dept: VACCINE CLINIC | Facility: HOSPITAL | Age: 81
End: 2021-04-30

## 2021-04-30 PROCEDURE — 91300 HC SARSCOV02 VAC 30MCG/0.3ML IM: CPT | Performed by: INTERNAL MEDICINE

## 2021-04-30 PROCEDURE — 0002A: CPT | Performed by: INTERNAL MEDICINE

## 2021-10-07 ENCOUNTER — OFFICE VISIT (OUTPATIENT)
Dept: CARDIOLOGY | Facility: CLINIC | Age: 81
End: 2021-10-07

## 2021-10-07 VITALS
BODY MASS INDEX: 32.58 KG/M2 | SYSTOLIC BLOOD PRESSURE: 124 MMHG | WEIGHT: 220 LBS | HEART RATE: 70 BPM | HEIGHT: 69 IN | DIASTOLIC BLOOD PRESSURE: 72 MMHG | OXYGEN SATURATION: 98 %

## 2021-10-07 DIAGNOSIS — I25.10 CORONARY ARTERY DISEASE INVOLVING NATIVE CORONARY ARTERY OF NATIVE HEART WITHOUT ANGINA PECTORIS: Primary | ICD-10-CM

## 2021-10-07 DIAGNOSIS — E78.2 MIXED HYPERLIPIDEMIA: Chronic | ICD-10-CM

## 2021-10-07 DIAGNOSIS — I10 ESSENTIAL HYPERTENSION: ICD-10-CM

## 2021-10-07 PROCEDURE — 99214 OFFICE O/P EST MOD 30 MIN: CPT | Performed by: NURSE PRACTITIONER

## 2021-10-07 RX ORDER — CLOPIDOGREL BISULFATE 75 MG/1
75 TABLET ORAL DAILY
Qty: 90 TABLET | Refills: 3 | Status: SHIPPED | OUTPATIENT
Start: 2021-10-07

## 2021-10-07 RX ORDER — DIPHENOXYLATE HYDROCHLORIDE AND ATROPINE SULFATE 2.5; .025 MG/1; MG/1
1 TABLET ORAL DAILY
COMMUNITY

## 2021-10-07 RX ORDER — LISINOPRIL AND HYDROCHLOROTHIAZIDE 20; 12.5 MG/1; MG/1
1 TABLET ORAL 2 TIMES DAILY
Qty: 90 TABLET | Refills: 3 | Status: SHIPPED | OUTPATIENT
Start: 2021-10-07

## 2021-10-07 NOTE — PROGRESS NOTES
"Chief Complaint  Follow-up (6 months)    Subjective          Christopher Clay presents to [facility] CARDIOLOGY for follow-up.    History of Present Illness    Patient was last seen in clinic on 3/18/2021.  CAD, hypertension and dyslipidemia were stable.    At today's visit Mr. Clay denies any complaint of chest pain or palpitations.  He denies shortness of breath or dyspnea on exertion.  He denies dizziness or lightheadedness.  He denies PND and orthopnea.      Objective     Vital Signs:   /72 (BP Location: Left arm, Patient Position: Sitting, Cuff Size: Adult)   Pulse 70   Ht 175.3 cm (69\")   Wt 99.8 kg (220 lb)   SpO2 98%   BMI 32.49 kg/m²       Physical Exam  Constitutional:       Appearance: Normal appearance. He is well-developed.   Cardiovascular:      Rate and Rhythm: Normal rate and regular rhythm.      Heart sounds: No murmur heard.  No friction rub. No gallop.    Pulmonary:      Effort: Pulmonary effort is normal. No respiratory distress.      Breath sounds: Normal breath sounds. No wheezing or rales.   Skin:     General: Skin is warm and dry.   Neurological:      Mental Status: He is alert and oriented to person, place, and time.   Psychiatric:         Mood and Affect: Mood normal.         Behavior: Behavior normal.          Result Review :       Data reviewed: Cardiology studies Echocardiogram       4/7/2021 transthoracic echocardiogram      Current Outpatient Medications   Medication Sig Dispense Refill   • Cholecalciferol (VITAMIN D) 2000 UNITS tablet Take 2,000 Units by mouth Daily.     • clopidogrel (PLAVIX) 75 MG tablet Take 1 tablet by mouth Daily. 90 tablet 3   • coenzyme Q10 100 MG capsule Take 100 mg by mouth Daily.     • lisinopril-hydrochlorothiazide (PRINZIDE,ZESTORETIC) 20-12.5 MG per tablet Take 1 tablet by mouth 2 (two) times a day. 90 tablet 3   • multivitamin (MULTI-VITAMIN DAILY PO) Take 1 tablet by mouth Daily.       No current facility-administered " medications for this visit.            Assessment and Plan    Problem List Items Addressed This Visit        Cardiac and Vasculature    Essential hypertension (Chronic)    Relevant Medications    clopidogrel (PLAVIX) 75 MG tablet    lisinopril-hydrochlorothiazide (PRINZIDE,ZESTORETIC) 20-12.5 MG per tablet    Hyperlipidemia (Chronic)    Overview     · 9/9/2019 total cholesterol 101, triglycerides 115, HDL 28, LDL 50         Coronary artery disease involving native coronary artery of native heart - Primary (Chronic)    Overview     · 6/30/2015 [facility] for STEMI: PCI GRAEME to proximal LAD; LV gram 45% with hypokinesis of the anterior wall  · 10/18/2016 stress MPI shows large size moderate to severe area of ischemia located in the anterior wall  · 11/1/2016 [facility] for abnormal stress: PCI GRAEME to the LAD         Relevant Medications    clopidogrel (PLAVIX) 75 MG tablet              Follow Up     Medications were reviewed with the patient.    No medication changes were made today.    CAD is stable.  Patient is to continue Plavix, lisinopril.    Patient declines statin medication.    Patient had labs drawn recently at primary care provider's office.  We will request.    Return in about 6 months (around 4/7/2022).    Patient was given instructions and counseling regarding his condition or for health maintenance advice. Please see specific information pulled into the AVS if appropriate.

## 2022-04-07 ENCOUNTER — OFFICE VISIT (OUTPATIENT)
Dept: CARDIOLOGY | Facility: CLINIC | Age: 82
End: 2022-04-07

## 2022-04-07 VITALS
HEART RATE: 84 BPM | OXYGEN SATURATION: 98 % | BODY MASS INDEX: 33.47 KG/M2 | SYSTOLIC BLOOD PRESSURE: 144 MMHG | DIASTOLIC BLOOD PRESSURE: 80 MMHG | WEIGHT: 226 LBS | HEIGHT: 69 IN

## 2022-04-07 DIAGNOSIS — I25.10 CORONARY ARTERY DISEASE INVOLVING NATIVE CORONARY ARTERY OF NATIVE HEART WITHOUT ANGINA PECTORIS: Primary | Chronic | ICD-10-CM

## 2022-04-07 DIAGNOSIS — I10 ESSENTIAL HYPERTENSION: Chronic | ICD-10-CM

## 2022-04-07 DIAGNOSIS — E78.2 MIXED HYPERLIPIDEMIA: Chronic | ICD-10-CM

## 2022-04-07 PROCEDURE — 99213 OFFICE O/P EST LOW 20 MIN: CPT | Performed by: NURSE PRACTITIONER

## 2022-04-07 NOTE — PROGRESS NOTES
"Chief Complaint  Follow-up (CAD)    Subjective          Christopher Clay presents to [facility] CARDIOLOGY for follow up.    History of Present Illness    Mr Clay was last seen in clinic on 10/7/2021.  At that time patient was stable and no changes were made.    At today's visit Mr. Clay denies any problems with chest pain, shortness of breath or dyspnea on exertion.  He denies palpitations or irregular heartbeats.  He denies any PND and orthopnea.  He states that he has been well.  He reports medication compliance.  He states that his blood pressures at home are generally in the 120s and 130s systolically.    Objective     Vital Signs:   /80   Pulse 84   Ht 175.3 cm (69\")   Wt 103 kg (226 lb)   SpO2 98%   BMI 33.37 kg/m²       Physical Exam  Vitals reviewed.   Constitutional:       Appearance: Normal appearance. He is well-developed.   Cardiovascular:      Rate and Rhythm: Normal rate and regular rhythm.      Heart sounds: No murmur heard.    No friction rub. No gallop.   Pulmonary:      Effort: Pulmonary effort is normal. No respiratory distress.      Breath sounds: Normal breath sounds. No wheezing or rales.   Skin:     General: Skin is warm and dry.   Neurological:      Mental Status: He is alert and oriented to person, place, and time.   Psychiatric:         Mood and Affect: Mood normal.         Behavior: Behavior normal.          Result Review :                Current Outpatient Medications   Medication Sig Dispense Refill   • Cholecalciferol (VITAMIN D) 2000 UNITS tablet Take 2,000 Units by mouth Daily.     • clopidogrel (PLAVIX) 75 MG tablet Take 1 tablet by mouth Daily. 90 tablet 3   • coenzyme Q10 100 MG capsule Take 100 mg by mouth Daily.     • lisinopril-hydrochlorothiazide (PRINZIDE,ZESTORETIC) 20-12.5 MG per tablet Take 1 tablet by mouth 2 (two) times a day. 90 tablet 3   • multivitamin (THERAGRAN) tablet tablet Take 1 tablet by mouth Daily.     • Probiotic Product " (Virtusize PO) Take  by mouth.       No current facility-administered medications for this visit.            Assessment and Plan    Problem List Items Addressed This Visit        Cardiac and Vasculature    Essential hypertension (Chronic)    Hyperlipidemia (Chronic)    Overview     · 9/9/2019 total cholesterol 101, triglycerides 115, HDL 28, LDL 50  · 9/17/2021 total cholesterol 164, triglycerides 114, HDL cholesterol 36 and            Coronary artery disease involving native coronary artery of native heart - Primary (Chronic)    Overview     · 6/30/2015 [facility] for STEMI: PCI GRAEME to proximal LAD; LV gram 45% with hypokinesis of the anterior wall  · 10/18/2016 stress MPI shows large size moderate to severe area of ischemia located in the anterior wall  · 11/1/2016 [facility] for abnormal stress: PCI GRAEME to the LAD                     Follow Up     Medications were reviewed with the patient.    ASCVD is stable.  Patient is to continue GDMT including Plavix, lisinopril.  Patient has had myalgias with statins in the past and refuses any other cholesterol-lowering medications.    Hypertension is controlled.    With regard to dyslipidemia, he does tell me that his PCP MACKENZIE Fernando, tests his lipids regularly and does not have any concerns.  We will request most recent labs.    Advance Care Planning   ACP discussion was held with the patient during this visit. Patient does not have an advance directive, information provided.       Return in about 6 months (around 10/7/2022).    Patient was given instructions and counseling regarding his condition or for health maintenance advice. Please see specific information pulled into the AVS if appropriate.

## 2022-10-13 ENCOUNTER — OFFICE VISIT (OUTPATIENT)
Dept: CARDIOLOGY | Facility: CLINIC | Age: 82
End: 2022-10-13

## 2022-10-13 VITALS
DIASTOLIC BLOOD PRESSURE: 80 MMHG | HEART RATE: 73 BPM | BODY MASS INDEX: 33.12 KG/M2 | SYSTOLIC BLOOD PRESSURE: 138 MMHG | HEIGHT: 69 IN | WEIGHT: 223.6 LBS

## 2022-10-13 DIAGNOSIS — E78.2 MIXED HYPERLIPIDEMIA: Chronic | ICD-10-CM

## 2022-10-13 DIAGNOSIS — I25.10 CORONARY ARTERY DISEASE INVOLVING NATIVE CORONARY ARTERY OF NATIVE HEART WITHOUT ANGINA PECTORIS: Primary | Chronic | ICD-10-CM

## 2022-10-13 DIAGNOSIS — I10 ESSENTIAL HYPERTENSION: Chronic | ICD-10-CM

## 2022-10-13 PROCEDURE — 99214 OFFICE O/P EST MOD 30 MIN: CPT | Performed by: NURSE PRACTITIONER

## 2022-10-13 NOTE — PROGRESS NOTES
"Chief Complaint  Follow-up (Patient is here for a follow up. Patient denies chest pain.) and Med Management (Reviewed patients medication. Patient is tolerating medication well.)    Subjective          Christopher Clay presents to [facility] CARDIOLOGY for follow-up.    History of Present Illness    Mr. Clay was last seen in clinic on 4/7/2022.  Patient was doing well at that time and no changes were made to his medications.    At today's visit Mr. Clay denies any chest pain or palpitations.  He denies shortness of breath or dyspnea on exertion.  He denies syncope or near syncopal event.  He denies any bright red blood per rectum or black tarry stools.  He states that he has been doing well.    Objective     Vital Signs:   /80 (BP Location: Left arm, Patient Position: Sitting, Cuff Size: Large Adult)   Pulse 73   Ht 175.3 cm (69.02\")   Wt 101 kg (223 lb 9.6 oz)   BMI 33.00 kg/m²       Physical Exam  Vitals reviewed.   Constitutional:       Appearance: Normal appearance. He is well-developed.   Cardiovascular:      Rate and Rhythm: Normal rate and regular rhythm.      Heart sounds: No murmur heard.    No friction rub. No gallop.   Pulmonary:      Effort: Pulmonary effort is normal. No respiratory distress.      Breath sounds: Normal breath sounds. No wheezing or rales.   Skin:     General: Skin is warm and dry.   Neurological:      Mental Status: He is alert and oriented to person, place, and time.   Psychiatric:         Mood and Affect: Mood normal.         Behavior: Behavior normal.          Result Review :                Current Outpatient Medications   Medication Sig Dispense Refill   • Cholecalciferol (VITAMIN D) 2000 UNITS tablet Take 2,000 Units by mouth Daily.     • clopidogrel (PLAVIX) 75 MG tablet Take 1 tablet by mouth Daily. 90 tablet 3   • coenzyme Q10 100 MG capsule Take 100 mg by mouth Daily.     • lisinopril-hydrochlorothiazide (PRINZIDE,ZESTORETIC) 20-12.5 MG per " tablet Take 1 tablet by mouth 2 (two) times a day. 90 tablet 3   • multivitamin (THERAGRAN) tablet tablet Take 1 tablet by mouth Daily.     • Probiotic Product ([employer] PO) Take  by mouth.       No current facility-administered medications for this visit.            Assessment and Plan    Problem List Items Addressed This Visit        Cardiac and Vasculature    Essential hypertension (Chronic)    Hyperlipidemia (Chronic)    Overview     · 9/9/2019 total cholesterol 101, triglycerides 115, HDL 28, LDL 50  · 9/17/2021 total cholesterol 164, triglycerides 114, HDL cholesterol 36 and          Coronary artery disease involving native coronary artery of native heart - Primary (Chronic)    Overview     · 6/30/2015 [facility] for STEMI: PCI GRAEME to proximal LAD; LV gram 45% with hypokinesis of the anterior wall  · 10/18/2016 stress MPI shows large size moderate to severe area of ischemia located in the anterior wall  · 11/1/2016 [facility] for abnormal stress: PCI GRAEME to the LAD                Follow Up     Medications were reviewed with the patient.    ASCVD is stable.  Continue Plavix and lisinopril.    With regard to dyslipidemia, labs from his PCP office have been requested.  Patient has intolerance to statins.    Hypertension is controlled.      Return in about 6 months (around 4/13/2023).    Patient was given instructions and counseling regarding his condition or for health maintenance advice. Please see specific information pulled into the AVS if appropriate.

## 2023-04-27 ENCOUNTER — OFFICE VISIT (OUTPATIENT)
Dept: CARDIOLOGY | Facility: CLINIC | Age: 83
End: 2023-04-27
Payer: MEDICARE

## 2023-04-27 VITALS
OXYGEN SATURATION: 99 % | WEIGHT: 219.2 LBS | DIASTOLIC BLOOD PRESSURE: 73 MMHG | HEIGHT: 69 IN | HEART RATE: 69 BPM | BODY MASS INDEX: 32.47 KG/M2 | SYSTOLIC BLOOD PRESSURE: 123 MMHG

## 2023-04-27 DIAGNOSIS — E78.2 MIXED HYPERLIPIDEMIA: Primary | Chronic | ICD-10-CM

## 2023-04-27 DIAGNOSIS — I25.10 CORONARY ARTERY DISEASE INVOLVING NATIVE CORONARY ARTERY OF NATIVE HEART WITHOUT ANGINA PECTORIS: Chronic | ICD-10-CM

## 2023-04-27 DIAGNOSIS — I10 ESSENTIAL HYPERTENSION: Chronic | ICD-10-CM

## 2023-04-27 PROCEDURE — 99213 OFFICE O/P EST LOW 20 MIN: CPT | Performed by: NURSE PRACTITIONER

## 2023-04-27 PROCEDURE — 3078F DIAST BP <80 MM HG: CPT | Performed by: NURSE PRACTITIONER

## 2023-04-27 PROCEDURE — 1160F RVW MEDS BY RX/DR IN RCRD: CPT | Performed by: NURSE PRACTITIONER

## 2023-04-27 PROCEDURE — 1159F MED LIST DOCD IN RCRD: CPT | Performed by: NURSE PRACTITIONER

## 2023-04-27 PROCEDURE — 3074F SYST BP LT 130 MM HG: CPT | Performed by: NURSE PRACTITIONER

## 2023-04-27 NOTE — PROGRESS NOTES
"Chief Complaint  Follow-up (6 mo follow up) and Med Management (Verbally reviewed medications with patient, patient is tolerating medications well. )    Subjective          Christopher Clay presents to [facility] CARDIOLOGY for follow up.    History of Present Illness    Mr. Clay was last seen in clinic on 10/13/2022.  Patient was stable at that time and no changes were made to his medications.    Patient has no complaints of chest pain, palpitations, shortness of breath or dyspnea on exertion.  He states that he is doing well.  He does report blood pressures at home in the 120s up to 130 with diastolics in the 60s and 70s.  I did repeat his blood pressure and it was well controlled, 123/73.    Objective     Vital Signs:   /73   Pulse 69   Ht 175.3 cm (69.02\")   Wt 99.4 kg (219 lb 3.2 oz)   SpO2 99%   BMI 32.36 kg/m²       Physical Exam  Vitals reviewed.   Constitutional:       Appearance: Normal appearance. He is well-developed.   Cardiovascular:      Rate and Rhythm: Normal rate and regular rhythm.      Heart sounds: No murmur heard.    No friction rub. No gallop.   Pulmonary:      Effort: Pulmonary effort is normal. No respiratory distress.      Breath sounds: Normal breath sounds. No wheezing or rales.   Skin:     General: Skin is warm and dry.   Neurological:      Mental Status: He is alert and oriented to person, place, and time.   Psychiatric:         Mood and Affect: Mood normal.         Behavior: Behavior normal.          Result Review :            ECG 12 Lead    Date/Time: 4/27/2023 10:44 AM  Performed by: Chari Ramirez APRN  Authorized by: Chari Ramirez APRN   Comparison: compared with previous ECG from 3/18/2021  Similar to previous ECG  Comparison to previous ECG: Sinus rhythm with occasional PACs, 69 bpm  Rhythm: sinus rhythm  Rate: normal  BPM: 65  Q waves: V1, V2 and V3    T flattening: V1 and V2               Current Outpatient Medications   Medication Sig " Dispense Refill   • Cholecalciferol (VITAMIN D) 2000 UNITS tablet Take 2,000 Units by mouth Daily.     • clopidogrel (PLAVIX) 75 MG tablet Take 1 tablet by mouth Daily. 90 tablet 3   • coenzyme Q10 100 MG capsule Take 1 capsule by mouth Daily.     • lisinopril-hydrochlorothiazide (PRINZIDE,ZESTORETIC) 20-12.5 MG per tablet Take 1 tablet by mouth 2 (two) times a day. 90 tablet 3   • multivitamin (THERAGRAN) tablet tablet Take 1 tablet by mouth Daily.     • Probiotic Product (Advanova PO) Take  by mouth.       No current facility-administered medications for this visit.            Assessment and Plan    Problem List Items Addressed This Visit        Cardiac and Vasculature    Essential hypertension (Chronic)    Hyperlipidemia - Primary (Chronic)    Overview     · 9/9/2019 total cholesterol 101, triglycerides 115, HDL 28, LDL 50  · 9/17/2021 total cholesterol 164, triglycerides 114, HDL cholesterol 36 and          Coronary artery disease involving native coronary artery of native heart (Chronic)    Overview     · 6/30/2015 [facility] for STEMI: PCI GRAEME to proximal LAD; LV gram 45% with hypokinesis of the anterior wall  · 10/18/2016 stress MPI shows large size moderate to severe area of ischemia located in the anterior wall  · 11/1/2016 [facility] for abnormal stress: PCI GRAEME to the LAD                Follow Up     Medications were reviewed with the patient.    CAD is stable.  Patient is to continue Plavix, lisinopril.  He is not on beta-blocker due to baseline low heart rate and he is not taking statin due to side effects.    Hypertension is controlled, continue current medications.    With regard to dyslipidemia, labs requested from PCP office    Return in about 6 months (around 10/27/2023).    Patient was given instructions and counseling regarding his condition or for health maintenance advice. Please see specific information pulled into the AVS if appropriate.

## 2023-10-20 ENCOUNTER — OFFICE VISIT (OUTPATIENT)
Dept: CARDIOLOGY | Facility: CLINIC | Age: 83
End: 2023-10-20
Payer: MEDICARE

## 2023-10-20 VITALS
HEIGHT: 69 IN | HEART RATE: 69 BPM | DIASTOLIC BLOOD PRESSURE: 79 MMHG | BODY MASS INDEX: 32.76 KG/M2 | SYSTOLIC BLOOD PRESSURE: 129 MMHG | WEIGHT: 221.2 LBS | OXYGEN SATURATION: 98 %

## 2023-10-20 DIAGNOSIS — I25.10 CORONARY ARTERY DISEASE INVOLVING NATIVE CORONARY ARTERY OF NATIVE HEART WITHOUT ANGINA PECTORIS: Primary | Chronic | ICD-10-CM

## 2023-10-20 DIAGNOSIS — E78.2 MIXED HYPERLIPIDEMIA: Chronic | ICD-10-CM

## 2023-10-20 DIAGNOSIS — I10 ESSENTIAL HYPERTENSION: Chronic | ICD-10-CM

## 2023-10-20 PROCEDURE — 1159F MED LIST DOCD IN RCRD: CPT | Performed by: NURSE PRACTITIONER

## 2023-10-20 PROCEDURE — 3078F DIAST BP <80 MM HG: CPT | Performed by: NURSE PRACTITIONER

## 2023-10-20 PROCEDURE — 1160F RVW MEDS BY RX/DR IN RCRD: CPT | Performed by: NURSE PRACTITIONER

## 2023-10-20 PROCEDURE — 3074F SYST BP LT 130 MM HG: CPT | Performed by: NURSE PRACTITIONER

## 2023-10-20 PROCEDURE — 99214 OFFICE O/P EST MOD 30 MIN: CPT | Performed by: NURSE PRACTITIONER

## 2023-10-20 NOTE — PROGRESS NOTES
"Chief Complaint  Follow-up (Pt denies CP, no cardiac symptoms.) and Med Review (Tolerating all current medications.)    Subjective          Christopher Clay presents to [facility]ARDIOLOG[facility] for follow up.    History of Present Illness    Mr. Clay was last seen in clinic on 4/27/2023.  He was doing well at that time and no changes were made to his medications.    At today's visit Mr. Clay denies any episodes of chest pain, palpitations or shortness of breath.  He denies dyspnea on exertion.  He denies lower extremity swelling, PND and orthopnea.  He states that he has been doing well.    There is some confusion regarding his medications.  He thinks that he is not taking Plavix because of its side effects.  He further states that he is only taking 2 prescription medications.  He is unaware of the names of those medications.  I do know in the past he has refused statin medication because of side effects.    Objective     Vital Signs:   /79   Pulse 69   Ht 175.3 cm (69\")   Wt 100 kg (221 lb 3.2 oz)   SpO2 98%   BMI 32.67 kg/m²       Physical Exam  Constitutional:       Appearance: Normal appearance. He is well-developed.   Cardiovascular:      Rate and Rhythm: Normal rate and regular rhythm.      Heart sounds: No murmur heard.     No friction rub. No gallop.   Pulmonary:      Effort: Pulmonary effort is normal. No respiratory distress.      Breath sounds: Normal breath sounds. No wheezing or rales.   Skin:     General: Skin is warm and dry.   Neurological:      Mental Status: He is alert and oriented to person, place, and time.   Psychiatric:         Mood and Affect: Mood normal.         Behavior: Behavior normal.          Result Review :                Most recent Stress Test  Results for orders placed during the hospital encounter of 10/18/16    Stress test with myocardial perfusion one day    Interpretation Summary  · Left ventricular ejection fraction is normal (Calculated EF = " 51%).  · Myocardial perfusion imaging indicates a large-sized, moderate-to-severe area of ischemia located in the anterior wall.  · Impressions are consistent with a high risk study.  · moderate risk for ischemic heart disease.       Most recent Cardiac Cath  Results for orders placed during the hospital encounter of 11/01/16    Cardiac catheterization    Narrative  · Right dominant coronary artery system with single vessel disease involving the LAD  · Successful angioplasty and stenting of the proximal LAD.    Final impression:  Right dominant coronary artery system with single vessel disease involving the LAD  Successful angioplasty and stenting of the LAD    Procedures performed:  Coronary angiography  Angioplasty and stenting of the LAD    History of present illness:  Christopher is a very pleasant 76-year-old gentleman who presents with a history of known coronary artery disease status post stent implantation to his proximal LAD.  He had been doing well however had recurrent chest discomfort which was initially managed medically.  He eventually did undergo a stress Cardiolite test which was abnormal and suggestive of considerable ischemia involving the anterior wall.  The test was interpreted as moderate risk.  Given this as well as his progressive symptoms despite medical management he was referred for cardiac catheterization with possible stent implantation.  The patient was given informed consent apprising the risks and benefits of doing so he understood these risks and agreed to proceed.    Procedure in detail the patient was brought to the cardiac catheterization laboratory and prepped and draped in the usual sterile fashion.  Adequate anesthesia was obtained by infiltrating the right groin with local lidocaine.  Using a modified Seldinger technique a 5 [place] sheath was placed in the right femoral artery.  A 5 [place] JL4 catheter was used to engage the ostium of the left main coronary artery and angiography  was performed in varying views using hand injection of contrast material.  After that a 5 Vincentian JR4 catheter was used to engage the ostium of the right coronary artery and angiography was again performed in varying views using hand injection of contrast material.  At this point the patient was given adequate heparinization to achieve an ACT greater than 200.  He already been taking Plavix.  A 6 Vincentian sheath was exchanged for the 5 Vincentian sheath and a 6 Vincentian XB 4.0 guiding catheter was used to engage the ostium of the left main coronary artery.  A BMW was placed atraumatically in the distal portion of the LAD.  The lesion was predilated with a 2.0 x 15 mm balloon.  A 3.0 x 12 mm Alpine drug-eluting stent was then implanted at 12 em.  The balloon was reinflated to 12 em.  After this the stent was postdilated with a 3.5 mm x 12 mm noncompliant balloon.  Inflation of 12 em was used in the distal portion of the stent and inflation of 8 em was used approximately to adequately match both ends of the stent to the vessel diameter.  After this orthogonal angiography revealed an  Result without evidence of dissection and BAY-3 flow in the distal vascular bed.  The guidewire was withdrawn and orthogonal in Mirian's repeated which confirmed the above-noted results.  The patient was returned to the floor without evidence of complication.    Findings in detail:  Left Main coronary artery:  The left main coronary artery is a large vessel which trifurcates into the LAD and ramus intermedius branch and the circumflex artery.  The left main coronary artery is free of atherosclerotic disease.    Left anterior descending artery:  The left anterior descending artery is a large vessel which reaches beyond the apex the ventricle and gives rise to 2 diagonal branches the first of which is small in the next of which is moderate in size.  There is a previously implanted stent in the proximal portion of the LAD.  Just proximal to  this there is a severe 99% stenotic area.  This is the culprit lesion.  The lesion length is 10 mm.  The vessel diameter is approximately 3.25 mm.  BAY flow prior to intervention is BAY 2 BAY flow postintervention is BAY-3. Post intervention the lesion was reduced to a 0% residual stenosis.    Circumflex artery:  The circumflex artery is a moderate-sized vessel which gives rise to one branching obtuse marginal vessel.  The circumflex artery is free of atherosclerotic disease.    Ramus intermedius branch:  The ramus intermedius branch is a small to moderate-sized vessel which is free of after scrubbing disease.    Right coronary artery:  The right coronary artery is a large vessel which gives rise to the posterior descending artery and several posterior lateral branches.  The right coronary artery is free of after scrubbing disease.    Final impression and plan:  Overall it is my impression that Mr. Clay has undergone successful percutaneous revascularization of the LAD.  I did review his prior films.  In retrospect I did not see anything that was overtly concerning and would predict in-stent restenosis.  I suspect that he will do well however, given his clinical history I will follow him closely over the next several months.      Most recent Echocardiogram  Results for orders placed during the hospital encounter of 04/07/21    Adult Transthoracic Echo Complete W/ Cont if Necessary Per Protocol    Interpretation Summary  · Left ventricular ejection fraction appears to be 61 - 65%. Left ventricular systolic function is normal.  · Left ventricular wall thickness is consistent with mild concentric hypertrophy.  · No significant functional valvular abnormalities noted  · There is no evidence of pericardial effusion        Current Outpatient Medications   Medication Sig Dispense Refill    Cholecalciferol (VITAMIN D) 2000 UNITS tablet Take 1 tablet by mouth Daily.      clopidogrel (PLAVIX) 75 MG tablet Take 1 tablet  "by mouth Daily. 90 tablet 3    coenzyme Q10 100 MG capsule Take 1 capsule by mouth Daily.      lisinopril-hydrochlorothiazide (PRINZIDE,ZESTORETIC) 20-12.5 MG per tablet Take 1 tablet by mouth 2 (two) times a day. 90 tablet 3    multivitamin (THERAGRAN) tablet tablet Take 1 tablet by mouth Daily.       No current facility-administered medications for this visit.            Assessment and Plan    Problem List Items Addressed This Visit          Cardiac and Vasculature    Essential hypertension (Chronic)    Hyperlipidemia (Chronic)    Overview     9/9/2019 total cholesterol 101, triglycerides 115, HDL 28, LDL 50  9/17/2021 total cholesterol 164, triglycerides 114, HDL cholesterol 36 and          Coronary artery disease involving native coronary artery of native heart - Primary (Chronic)    Overview     6/30/2015 [facility] for STEMI: PCI GRAEME to proximal LAD; LV gram 45% with hypokinesis of the anterior wall  10/18/2016 stress MPI shows large size moderate to severe area of ischemia located in the anterior wall  11/1/2016 [facility] for abnormal stress: PCI GRAEME to the LAD         Relevant Orders    Adult Transthoracic Echo Complete W/ Cont if Necessary Per Protocol           Follow Up     Medications were reviewed with the patient.      I did call the pharmacy to check on the prescribed medications for Mr. Clay.  He is on lisinopril/hydrochlorothiazide and Plavix.  He is picking these medications up regularly.    Coronary artery disease is stable.  Patient is to continue Plavix and lisinopril.  He is not on beta-blocker due to baseline low heart rate.  He does not take statin as personal choice.  He has been counseled regarding the benefits of statin however he continues to refuse.    According to the patient his cholesterol is \"good\".  I do not have a copy of recent labs and will request.    Hypertension is controlled, continue lisinopril with hydrochlorothiazide.    No follow-ups on file.    Patient was given " instructions and counseling regarding his condition or for health maintenance advice. Please see specific information pulled into the AVS if appropriate.

## 2023-10-20 NOTE — PATIENT INSTRUCTIONS
Please check your medication when you get home.  If you are taking the medication CLOPIDIGREL (PLAVIX) you do not need to be taking ASPIRIN 81 mg.  However, if your are not taking CLOPIDIGREL (PLAVIX) then you should start taking ASPIRIN 81 mg daily.

## 2023-12-12 ENCOUNTER — HOSPITAL ENCOUNTER (OUTPATIENT)
Dept: CARDIOLOGY | Facility: HOSPITAL | Age: 83
Discharge: HOME | End: 2023-12-12
Admitting: NURSE PRACTITIONER
Payer: MEDICARE

## 2023-12-12 DIAGNOSIS — I25.10 CORONARY ARTERY DISEASE INVOLVING NATIVE CORONARY ARTERY OF NATIVE HEART WITHOUT ANGINA PECTORIS: Chronic | ICD-10-CM

## 2023-12-12 PROCEDURE — 93306 TTE W/DOPPLER COMPLETE: CPT

## 2023-12-14 LAB
BH CV ECHO MEAS - AO MAX PG: 6.9 MMHG
BH CV ECHO MEAS - AO MEAN PG: 4 MMHG
BH CV ECHO MEAS - AO ROOT DIAM: 3.5 CM
BH CV ECHO MEAS - AO V2 MAX: 131 CM/SEC
BH CV ECHO MEAS - AO V2 VTI: 28 CM
BH CV ECHO MEAS - EDV(CUBED): 74.1 ML
BH CV ECHO MEAS - EDV(MOD-SP4): 40.3 ML
BH CV ECHO MEAS - EF(MOD-SP4): 52.4 %
BH CV ECHO MEAS - ESV(CUBED): 46.7 ML
BH CV ECHO MEAS - ESV(MOD-SP4): 19.2 ML
BH CV ECHO MEAS - FS: 14.3 %
BH CV ECHO MEAS - IVS/LVPW: 1.13 CM
BH CV ECHO MEAS - IVSD: 1.7 CM
BH CV ECHO MEAS - LA DIMENSION: 3.2 CM
BH CV ECHO MEAS - LAT PEAK E' VEL: 6.5 CM/SEC
BH CV ECHO MEAS - LV DIASTOLIC VOL/BSA (35-75): 18.7 CM2
BH CV ECHO MEAS - LV MASS(C)D: 276.1 GRAMS
BH CV ECHO MEAS - LV SYSTOLIC VOL/BSA (12-30): 8.9 CM2
BH CV ECHO MEAS - LVIDD: 4.2 CM
BH CV ECHO MEAS - LVIDS: 3.6 CM
BH CV ECHO MEAS - LVOT AREA: 3.5 CM2
BH CV ECHO MEAS - LVOT DIAM: 2.1 CM
BH CV ECHO MEAS - LVPWD: 1.5 CM
BH CV ECHO MEAS - MED PEAK E' VEL: 5.9 CM/SEC
BH CV ECHO MEAS - MV A MAX VEL: 80.4 CM/SEC
BH CV ECHO MEAS - MV E MAX VEL: 65.6 CM/SEC
BH CV ECHO MEAS - MV E/A: 0.82
BH CV ECHO MEAS - PA ACC TIME: 0.13 SEC
BH CV ECHO MEAS - SI(MOD-SP4): 9.8 ML/M2
BH CV ECHO MEAS - SV(MOD-SP4): 21.1 ML
BH CV ECHO MEAS - TAPSE (>1.6): 2.6 CM
BH CV ECHO MEASUREMENTS AVERAGE E/E' RATIO: 10.58

## 2023-12-28 ENCOUNTER — TELEPHONE (OUTPATIENT)
Dept: CARDIOLOGY | Facility: CLINIC | Age: 83
End: 2023-12-28
Payer: COMMERCIAL

## 2023-12-28 NOTE — TELEPHONE ENCOUNTER
Spoke to patient regarding normal echo results. They are understanding of this and will keep follow up appointments. ----- Message from MACKENZIE Major sent at 12/27/2023  5:58 PM EST -----  Please call patient about their normal result.    The echocardiogram shows normal pumping function of the heart and no hemodynamically significant valvular abnormality.    Thanks, Chari

## 2024-04-05 ENCOUNTER — OFFICE VISIT (OUTPATIENT)
Dept: CARDIOLOGY | Facility: CLINIC | Age: 84
End: 2024-04-05
Payer: MEDICARE

## 2024-04-05 VITALS
OXYGEN SATURATION: 94 % | HEART RATE: 73 BPM | WEIGHT: 226 LBS | HEIGHT: 69 IN | DIASTOLIC BLOOD PRESSURE: 72 MMHG | BODY MASS INDEX: 33.47 KG/M2 | SYSTOLIC BLOOD PRESSURE: 148 MMHG

## 2024-04-05 DIAGNOSIS — I10 ESSENTIAL HYPERTENSION: Chronic | ICD-10-CM

## 2024-04-05 DIAGNOSIS — E78.2 MIXED HYPERLIPIDEMIA: Primary | Chronic | ICD-10-CM

## 2024-04-05 DIAGNOSIS — I25.10 CORONARY ARTERY DISEASE INVOLVING NATIVE CORONARY ARTERY OF NATIVE HEART WITHOUT ANGINA PECTORIS: Chronic | ICD-10-CM

## 2024-04-05 PROCEDURE — 99213 OFFICE O/P EST LOW 20 MIN: CPT | Performed by: NURSE PRACTITIONER

## 2024-04-05 PROCEDURE — 93000 ELECTROCARDIOGRAM COMPLETE: CPT | Performed by: NURSE PRACTITIONER

## 2024-04-05 PROCEDURE — 1160F RVW MEDS BY RX/DR IN RCRD: CPT | Performed by: NURSE PRACTITIONER

## 2024-04-05 PROCEDURE — 3077F SYST BP >= 140 MM HG: CPT | Performed by: NURSE PRACTITIONER

## 2024-04-05 PROCEDURE — 1159F MED LIST DOCD IN RCRD: CPT | Performed by: NURSE PRACTITIONER

## 2024-04-05 PROCEDURE — 3078F DIAST BP <80 MM HG: CPT | Performed by: NURSE PRACTITIONER

## 2024-09-06 ENCOUNTER — OFFICE VISIT (OUTPATIENT)
Dept: CARDIOLOGY | Facility: CLINIC | Age: 84
End: 2024-09-06
Payer: MEDICARE

## 2024-09-06 VITALS
SYSTOLIC BLOOD PRESSURE: 129 MMHG | WEIGHT: 225.4 LBS | HEIGHT: 69 IN | HEART RATE: 66 BPM | DIASTOLIC BLOOD PRESSURE: 78 MMHG | BODY MASS INDEX: 33.38 KG/M2 | OXYGEN SATURATION: 96 %

## 2024-09-06 DIAGNOSIS — I10 ESSENTIAL HYPERTENSION: Chronic | ICD-10-CM

## 2024-09-06 DIAGNOSIS — I25.10 CORONARY ARTERY DISEASE INVOLVING NATIVE CORONARY ARTERY OF NATIVE HEART WITHOUT ANGINA PECTORIS: Primary | Chronic | ICD-10-CM

## 2024-09-06 DIAGNOSIS — E78.2 MIXED HYPERLIPIDEMIA: Chronic | ICD-10-CM

## 2024-09-06 PROCEDURE — 1160F RVW MEDS BY RX/DR IN RCRD: CPT | Performed by: NURSE PRACTITIONER

## 2024-09-06 PROCEDURE — 1159F MED LIST DOCD IN RCRD: CPT | Performed by: NURSE PRACTITIONER

## 2024-09-06 PROCEDURE — 3078F DIAST BP <80 MM HG: CPT | Performed by: NURSE PRACTITIONER

## 2024-09-06 PROCEDURE — 99214 OFFICE O/P EST MOD 30 MIN: CPT | Performed by: NURSE PRACTITIONER

## 2024-09-06 PROCEDURE — 3074F SYST BP LT 130 MM HG: CPT | Performed by: NURSE PRACTITIONER

## 2024-09-06 NOTE — PROGRESS NOTES
"Chief Complaint  Follow-up (Pt denies CP,no other cardiac symptoms.) and Med Management (Tolerating all current medications.)    Subjective          Christopher Clay presents to [facility]ARDIOLOG[facility] for follow up.    History of Present Illness    Christopher Clay was last seen in clinic on 4/5/2024.  His blood pressure was elevated on that date and he was asked to keep a blood pressure log.  He stated that his usual BPs at home are in the 120s systolically.    At today's visit Christian denies any chest pain, palpitations, shortness of breath or dyspnea on exertion.  He does report that he has been doing well.  He denies lower extremity swelling.  He reports that he is staying active.    Objective     Vital Signs:   /78 (BP Location: Left arm, Patient Position: Sitting, Cuff Size: Adult)   Pulse 66   Ht 175.3 cm (69\")   Wt 102 kg (225 lb 6.4 oz)   SpO2 96%   BMI 33.29 kg/m²       Physical Exam  Vitals reviewed.   Constitutional:       Appearance: Normal appearance. He is well-developed.   Cardiovascular:      Rate and Rhythm: Normal rate and regular rhythm.      Heart sounds: No murmur heard.     No friction rub. No gallop.   Pulmonary:      Effort: Pulmonary effort is normal. No respiratory distress.      Breath sounds: Normal breath sounds. No wheezing or rales.   Skin:     General: Skin is warm and dry.   Neurological:      Mental Status: He is alert and oriented to person, place, and time.   Psychiatric:         Mood and Affect: Mood normal.         Behavior: Behavior normal.          Result Review :                Most recent echocardiogram  Results for orders placed during the hospital encounter of 12/12/23    Adult Transthoracic Echo Complete W/ Cont if Necessary Per Protocol    Interpretation Summary    Left ventricular systolic function is normal. Left ventricular ejection fraction appears to be 51 - 55%.    Left ventricular diastolic function is consistent with (grade I) impaired " relaxation.    There is no evidence of pericardial effusion.      Most recent Stress Test  Results for orders placed during the hospital encounter of 10/18/16    Stress test with myocardial perfusion one day    Interpretation Summary  · Left ventricular ejection fraction is normal (Calculated EF = 51%).  · Myocardial perfusion imaging indicates a large-sized, moderate-to-severe area of ischemia located in the anterior wall.  · Impressions are consistent with a high risk study.  · moderate risk for ischemic heart disease.       Most recent Cardiac Cath  Results for orders placed during the hospital encounter of 11/01/16    Cardiac catheterization    Narrative  · Right dominant coronary artery system with single vessel disease involving the LAD  · Successful angioplasty and stenting of the proximal LAD.    Final impression:  Right dominant coronary artery system with single vessel disease involving the LAD  Successful angioplasty and stenting of the LAD    Procedures performed:  Coronary angiography  Angioplasty and stenting of the LAD    History of present illness:  Christopher is a very pleasant 76-year-old gentleman who presents with a history of known coronary artery disease status post stent implantation to his proximal LAD.  He had been doing well however had recurrent chest discomfort which was initially managed medically.  He eventually did undergo a stress Cardiolite test which was abnormal and suggestive of considerable ischemia involving the anterior wall.  The test was interpreted as moderate risk.  Given this as well as his progressive symptoms despite medical management he was referred for cardiac catheterization with possible stent implantation.  The patient was given informed consent apprising the risks and benefits of doing so he understood these risks and agreed to proceed.    Procedure in detail the patient was brought to the cardiac catheterization laboratory and prepped and draped in the usual sterile  fashion.  Adequate anesthesia was obtained by infiltrating the right groin with local lidocaine.  Using a modified Seldinger technique a 5 [place] sheath was placed in the right femoral artery.  A 5 [place] JL4 catheter was used to engage the ostium of the left main coronary artery and angiography was performed in varying views using hand injection of contrast material.  After that a 5 [place] JR4 catheter was used to engage the ostium of the right coronary artery and angiography was again performed in varying views using hand injection of contrast material.  At this point the patient was given adequate heparinization to achieve an ACT greater than 200.  He already been taking Plavix.  A 6 [place] sheath was exchanged for the 5 [place] sheath and a 6 [place] XB 4.0 guiding catheter was used to engage the ostium of the left main coronary artery.  A BMW was placed atraumatically in the distal portion of the LAD.  The lesion was predilated with a 2.0 x 15 mm balloon.  A 3.0 x 12 mm Alpine drug-eluting stent was then implanted at 12 em.  The balloon was reinflated to 12 em.  After this the stent was postdilated with a 3.5 mm x 12 mm noncompliant balloon.  Inflation of 12 em was used in the distal portion of the stent and inflation of 8 em was used approximately to adequately match both ends of the stent to the vessel diameter.  After this orthogonal angiography revealed an  Result without evidence of dissection and BAY-3 flow in the distal vascular bed.  The guidewire was withdrawn and orthogonal in Mirian's repeated which confirmed the above-noted results.  The patient was returned to the floor without evidence of complication.    Findings in detail:  Left Main coronary artery:  The left main coronary artery is a large vessel which trifurcates into the LAD and ramus intermedius branch and the circumflex artery.  The left main coronary artery is free of atherosclerotic disease.    Left anterior descending artery:  The  left anterior descending artery is a large vessel which reaches beyond the apex the ventricle and gives rise to 2 diagonal branches the first of which is small in the next of which is moderate in size.  There is a previously implanted stent in the proximal portion of the LAD.  Just proximal to this there is a severe 99% stenotic area.  This is the culprit lesion.  The lesion length is 10 mm.  The vessel diameter is approximately 3.25 mm.  BAY flow prior to intervention is BAY 2 BAY flow postintervention is BAY-3. Post intervention the lesion was reduced to a 0% residual stenosis.    Circumflex artery:  The circumflex artery is a moderate-sized vessel which gives rise to one branching obtuse marginal vessel.  The circumflex artery is free of atherosclerotic disease.    Ramus intermedius branch:  The ramus intermedius branch is a small to moderate-sized vessel which is free of after scrubbing disease.    Right coronary artery:  The right coronary artery is a large vessel which gives rise to the posterior descending artery and several posterior lateral branches.  The right coronary artery is free of after scrubbing disease.    Final impression and plan:  Overall it is my impression that Mr. Clay has undergone successful percutaneous revascularization of the LAD.  I did review his prior films.  In retrospect I did not see anything that was overtly concerning and would predict in-stent restenosis.  I suspect that he will do well however, given his clinical history I will follow him closely over the next several months.      Current Outpatient Medications   Medication Sig Dispense Refill    Cholecalciferol (VITAMIN D) 2000 UNITS tablet Take 1 tablet by mouth Daily.      clopidogrel (PLAVIX) 75 MG tablet Take 1 tablet by mouth Daily. 90 tablet 3    coenzyme Q10 100 MG capsule Take 1 capsule by mouth Daily.      lisinopril-hydrochlorothiazide (PRINZIDE,ZESTORETIC) 20-12.5 MG per tablet Take 1 tablet by mouth 2 (two)  times a day. 90 tablet 3    multivitamin (THERAGRAN) tablet tablet Take 1 tablet by mouth Daily.       No current facility-administered medications for this visit.            Assessment and Plan    Problem List Items Addressed This Visit          Cardiac and Vasculature    Essential hypertension (Chronic)    Hyperlipidemia (Chronic)    Overview     9/9/2019 total cholesterol 101, triglycerides 115, HDL 28, LDL 50  9/17/2021 total cholesterol 164, triglycerides 114, HDL cholesterol 36 and    9/14/2023 total cholesterol 180, triglycerides 155, HDL 35, and          Coronary artery disease involving native coronary artery of native heart - Primary (Chronic)    Overview     6/30/2015 [facility] for STEMI: PCI GRAEME to proximal LAD; LV gram 45% with hypokinesis of the anterior wall  10/18/2016 stress MPI shows large size moderate to severe area of ischemia located in the anterior wall  11/1/2016 [facility] for abnormal stress: PCI GRAEME to the LAD                Follow Up     Medications were reviewed with the patient.    ASCVD/CAD is stable.  Continue Plavix, lisinopril/hydrochlorothiazide    HTN is controlled.  Continue current medications.    Patient refuses statin therapy for dyslipidemia.    Return in about 6 months (around 3/6/2025).    Patient was given instructions and counseling regarding his condition or for health maintenance advice. Please see specific information pulled into the AVS if appropriate.

## 2024-12-17 ENCOUNTER — HOSPITAL ENCOUNTER (OUTPATIENT)
Dept: MAMMOGRAPHY | Facility: HOSPITAL | Age: 84
Discharge: HOME | End: 2024-12-17
Payer: MEDICARE

## 2024-12-17 ENCOUNTER — HOSPITAL ENCOUNTER (OUTPATIENT)
Dept: ULTRASOUND IMAGING | Facility: HOSPITAL | Age: 84
Discharge: HOME | End: 2024-12-17
Payer: MEDICARE

## 2024-12-17 DIAGNOSIS — N64.4 BREAST PAIN: ICD-10-CM

## 2024-12-17 PROCEDURE — 77066 DX MAMMO INCL CAD BI: CPT

## 2024-12-17 PROCEDURE — G0279 TOMOSYNTHESIS, MAMMO: HCPCS | Performed by: RADIOLOGY

## 2024-12-17 PROCEDURE — G0279 TOMOSYNTHESIS, MAMMO: HCPCS

## 2024-12-17 PROCEDURE — 77066 DX MAMMO INCL CAD BI: CPT | Performed by: RADIOLOGY

## 2025-04-03 ENCOUNTER — OFFICE VISIT (OUTPATIENT)
Dept: CARDIOLOGY | Facility: CLINIC | Age: 85
End: 2025-04-03
Payer: MEDICARE

## 2025-04-03 VITALS
DIASTOLIC BLOOD PRESSURE: 78 MMHG | BODY MASS INDEX: 33.62 KG/M2 | HEIGHT: 69 IN | HEART RATE: 73 BPM | SYSTOLIC BLOOD PRESSURE: 147 MMHG | WEIGHT: 227 LBS | OXYGEN SATURATION: 97 %

## 2025-04-03 DIAGNOSIS — I25.10 CORONARY ARTERY DISEASE INVOLVING NATIVE CORONARY ARTERY OF NATIVE HEART WITHOUT ANGINA PECTORIS: Primary | Chronic | ICD-10-CM

## 2025-04-03 DIAGNOSIS — I10 ESSENTIAL HYPERTENSION: Chronic | ICD-10-CM

## 2025-04-03 DIAGNOSIS — E78.2 MIXED HYPERLIPIDEMIA: Chronic | ICD-10-CM

## 2025-04-03 PROCEDURE — 1160F RVW MEDS BY RX/DR IN RCRD: CPT | Performed by: NURSE PRACTITIONER

## 2025-04-03 PROCEDURE — 93000 ELECTROCARDIOGRAM COMPLETE: CPT | Performed by: NURSE PRACTITIONER

## 2025-04-03 PROCEDURE — 3078F DIAST BP <80 MM HG: CPT | Performed by: NURSE PRACTITIONER

## 2025-04-03 PROCEDURE — 1159F MED LIST DOCD IN RCRD: CPT | Performed by: NURSE PRACTITIONER

## 2025-04-03 PROCEDURE — 99214 OFFICE O/P EST MOD 30 MIN: CPT | Performed by: NURSE PRACTITIONER

## 2025-04-03 PROCEDURE — 3077F SYST BP >= 140 MM HG: CPT | Performed by: NURSE PRACTITIONER

## 2025-04-03 NOTE — PROGRESS NOTES
"Chief Complaint  Follow-up (Patient states doing well, denies chest pain )    Subjective          Christopher Clay presents to [facility] for follow up.    History of Present Illness  History of Present Illness  The patient is an 84-year-old male who follows up in the clinic with coronary artery disease, dyslipidemia, and hypertension. He was last seen in the clinic on 09/06/2024, and at that visit, he was stable from a cardiac standpoint, and no changes were made to his medications.    He reports a decrease in his walking distance compared to previous years but continues to work 3 days a week, although with fewer responsibilities than a decade ago. He experiences intermittent aches and pains, which he does not perceive as problematic. He is not experiencing any chest pain or shortness of breath during physical activity. His physical activity has slightly diminished, avoiding running, jumping, or long-distance walking.     He is not currently on statin therapy and expresses reluctance to start any new medications without a thorough understanding of their potential side effects. He recalls a recent discussion with his primary care physician, Dr. Luis Donaldson, who informed him of elevated LDL levels but reassured him that immediate intervention was not necessary.    His blood pressure readings have been consistently around 140 systolic.      Objective     Vital Signs:   /78 (BP Location: Left arm, Patient Position: Sitting, Cuff Size: Large Adult)   Pulse 73   Ht 175.3 cm (69\")   Wt 103 kg (227 lb)   SpO2 97%   BMI 33.52 kg/m²       Physical Exam  Constitutional:       Appearance: Normal appearance. He is well-developed.   Cardiovascular:      Rate and Rhythm: Normal rate and regular rhythm.      Heart sounds: No murmur heard.     No friction rub. No gallop.   Pulmonary:      Effort: Pulmonary effort is normal. No respiratory distress.      Breath sounds: Normal breath sounds. " No wheezing or rales.   Skin:     General: Skin is warm and dry.   Neurological:      Mental Status: He is alert and oriented to person, place, and time.   Psychiatric:         Mood and Affect: Mood normal.         Behavior: Behavior normal.          Result Review :                ECG 12 Lead    Date/Time: 4/3/2025 7:36 AM  Performed by: Chari Ramirez APRN    Authorized by: Chari Ramirez APRN  Comparison: compared with previous ECG from 4/5/2024  Similar to previous ECG  Rhythm: sinus rhythm  Ectopy: atrial premature contractions  Rate: normal  BPM: 71  Q waves: V1, V2, V3 and V4    Comments: When compared to EKG of 4/5/2024 first-degree AV block no longer present, supraventricular premature contractions now present           Most recent echocardiogram  Results for orders placed during the hospital encounter of 12/12/23    Adult Transthoracic Echo Complete W/ Cont if Necessary Per Protocol    Interpretation Summary    Left ventricular systolic function is normal. Left ventricular ejection fraction appears to be 51 - 55%.    Left ventricular diastolic function is consistent with (grade I) impaired relaxation.    There is no evidence of pericardial effusion.      Most recent Stress Test  Results for orders placed during the hospital encounter of 10/18/16    Stress test with myocardial perfusion one day    Interpretation Summary  · Left ventricular ejection fraction is normal (Calculated EF = 51%).  · Myocardial perfusion imaging indicates a large-sized, moderate-to-severe area of ischemia located in the anterior wall.  · Impressions are consistent with a high risk study.  · moderate risk for ischemic heart disease.       Most recent Cardiac Cath  Results for orders placed during the hospital encounter of 11/01/16    Cardiac catheterization    Narrative  · Right dominant coronary artery system with single vessel disease involving the LAD  · Successful angioplasty and stenting of the proximal LAD.    Final  impression:  Right dominant coronary artery system with single vessel disease involving the LAD  Successful angioplasty and stenting of the LAD    Procedures performed:  Coronary angiography  Angioplasty and stenting of the LAD    History of present illness:  Christopher is a very pleasant 76-year-old gentleman who presents with a history of known coronary artery disease status post stent implantation to his proximal LAD.  He had been doing well however had recurrent chest discomfort which was initially managed medically.  He eventually did undergo a stress Cardiolite test which was abnormal and suggestive of considerable ischemia involving the anterior wall.  The test was interpreted as moderate risk.  Given this as well as his progressive symptoms despite medical management he was referred for cardiac catheterization with possible stent implantation.  The patient was given informed consent apprising the risks and benefits of doing so he understood these risks and agreed to proceed.    Procedure in detail the patient was brought to the cardiac catheterization laboratory and prepped and draped in the usual sterile fashion.  Adequate anesthesia was obtained by infiltrating the right groin with local lidocaine.  Using a modified Seldinger technique a 5 Gambian sheath was placed in the right femoral artery.  A 5 Gambian JL4 catheter was used to engage the ostium of the left main coronary artery and angiography was performed in varying views using hand injection of contrast material.  After that a 5 Gambian JR4 catheter was used to engage the ostium of the right coronary artery and angiography was again performed in varying views using hand injection of contrast material.  At this point the patient was given adequate heparinization to achieve an ACT greater than 200.  He already been taking Plavix.  A 6 Gambian sheath was exchanged for the 5 Gambian sheath and a 6 Gambian XB 4.0 guiding catheter was used to engage the ostium of  the left main coronary artery.  A BMW was placed atraumatically in the distal portion of the LAD.  The lesion was predilated with a 2.0 x 15 mm balloon.  A 3.0 x 12 mm Alpine drug-eluting stent was then implanted at 12 em.  The balloon was reinflated to 12 em.  After this the stent was postdilated with a 3.5 mm x 12 mm noncompliant balloon.  Inflation of 12 em was used in the distal portion of the stent and inflation of 8 em was used approximately to adequately match both ends of the stent to the vessel diameter.  After this orthogonal angiography revealed an  Result without evidence of dissection and BAY-3 flow in the distal vascular bed.  The guidewire was withdrawn and orthogonal in Mirian's repeated which confirmed the above-noted results.  The patient was returned to the floor without evidence of complication.    Findings in detail:  Left Main coronary artery:  The left main coronary artery is a large vessel which trifurcates into the LAD and ramus intermedius branch and the circumflex artery.  The left main coronary artery is free of atherosclerotic disease.    Left anterior descending artery:  The left anterior descending artery is a large vessel which reaches beyond the apex the ventricle and gives rise to 2 diagonal branches the first of which is small in the next of which is moderate in size.  There is a previously implanted stent in the proximal portion of the LAD.  Just proximal to this there is a severe 99% stenotic area.  This is the culprit lesion.  The lesion length is 10 mm.  The vessel diameter is approximately 3.25 mm.  BAY flow prior to intervention is BAY 2 BAY flow postintervention is BAY-3. Post intervention the lesion was reduced to a 0% residual stenosis.    Circumflex artery:  The circumflex artery is a moderate-sized vessel which gives rise to one branching obtuse marginal vessel.  The circumflex artery is free of atherosclerotic disease.    Ramus intermedius branch:  The ramus  intermedius branch is a small to moderate-sized vessel which is free of after scrubbing disease.    Right coronary artery:  The right coronary artery is a large vessel which gives rise to the posterior descending artery and several posterior lateral branches.  The right coronary artery is free of after scrubbing disease.    Final impression and plan:  Overall it is my impression that Mr. Clay has undergone successful percutaneous revascularization of the LAD.  I did review his prior films.  In retrospect I did not see anything that was overtly concerning and would predict in-stent restenosis.  I suspect that he will do well however, given his clinical history I will follow him closely over the next several months.      Current Outpatient Medications   Medication Sig Dispense Refill    Cholecalciferol (VITAMIN D) 2000 UNITS tablet Take 1 tablet by mouth Daily.      clopidogrel (PLAVIX) 75 MG tablet Take 1 tablet by mouth Daily. 90 tablet 3    coenzyme Q10 100 MG capsule Take 1 capsule by mouth Daily.      lisinopril-hydrochlorothiazide (PRINZIDE,ZESTORETIC) 20-12.5 MG per tablet Take 1 tablet by mouth 2 (two) times a day. 90 tablet 3    multivitamin (THERAGRAN) tablet tablet Take 1 tablet by mouth Daily.       No current facility-administered medications for this visit.               Problem List Items Addressed This Visit          Cardiac and Vasculature    Essential hypertension (Chronic)    Hyperlipidemia (Chronic)    Overview   9/9/2019 total cholesterol 101, triglycerides 115, HDL 28, LDL 50  9/17/2021 total cholesterol 164, triglycerides 114, HDL cholesterol 36 and    9/14/2023 total cholesterol 180, triglycerides 155, HDL 35, and          Coronary artery disease involving native coronary artery of native heart - Primary (Chronic)    Overview   6/30/2015 [facility] for STEMI: PCI GRAEME to proximal LAD; LV gram 45% with hypokinesis of the anterior wall  10/18/2016 stress MPI shows large size moderate to  severe area of ischemia located in the anterior wall  11/1/2016 [facility] for abnormal stress: PCI GRAEME to the LAD,  12/12/2023 TTE: LVEF 51 to 55%, grade 1 diastolic dysfunction           Relevant Orders    ECG 12 Lead       Assessment & Plan  1. Coronary Artery Disease.  He reports no chest pain or shortness of breath. His EKG today is normal. He is currently not taking a statin and prefers to avoid it due to concerns about side effects. The potential benefits and risks of Zetia (ezetimibe) were discussed, including its gastrointestinal side effects and the lack of association with Alzheimer's or myalgias unless combined with a statin. He will review the information provided and decide whether to start Zetia. He will call if he decides to start the medication.    2. Dyslipidemia.  His LDL levels are slightly elevated at 108 mg/dL. Given his history of coronary artery disease, the target LDL is less than 55 mg/dL. The potential benefits and risks of Zetia (ezetimibe) were discussed. He will review the information and decide whether to start the medication. He will call if he decides to start the medication.    3. Hypertension.  His blood pressure today is 147/78 mmHg, which is slightly elevated. He reports that his blood pressure at home hovers around 140 mmHg.    Follow-up  The patient will follow up in 6 months.    PROCEDURE  The patient has undergone stent placement in the past.         Follow Up     Return in about 6 months (around 10/3/2025).    Patient was given instructions and counseling regarding his condition or for health maintenance advice. Please see specific information pulled into the AVS if appropriate.     Patient or patient representative verbalized consent for the use of Ambient Listening during the visit with  MACKENZIE Major for chart documentation. 4/3/2025  17:36 EDT